# Patient Record
Sex: FEMALE | Race: WHITE | HISPANIC OR LATINO | Employment: UNEMPLOYED | URBAN - METROPOLITAN AREA
[De-identification: names, ages, dates, MRNs, and addresses within clinical notes are randomized per-mention and may not be internally consistent; named-entity substitution may affect disease eponyms.]

---

## 2018-09-19 ENCOUNTER — HOSPITAL ENCOUNTER (EMERGENCY)
Age: 23
Discharge: HOME | End: 2018-09-19

## 2018-09-19 ENCOUNTER — APPOINTMENT (OUTPATIENT)
Dept: GENERAL RADIOLOGY | Age: 23
End: 2018-09-19
Attending: PHYSICIAN ASSISTANT

## 2018-09-19 ENCOUNTER — APPOINTMENT (OUTPATIENT)
Dept: CT IMAGING | Age: 23
End: 2018-09-19
Attending: PHYSICIAN ASSISTANT

## 2018-09-19 VITALS
TEMPERATURE: 98.5 F | RESPIRATION RATE: 18 BRPM | HEART RATE: 82 BPM | DIASTOLIC BLOOD PRESSURE: 93 MMHG | SYSTOLIC BLOOD PRESSURE: 155 MMHG | OXYGEN SATURATION: 100 % | WEIGHT: 240 LBS

## 2018-09-19 DIAGNOSIS — R07.89 ANTERIOR CHEST WALL PAIN: Primary | ICD-10-CM

## 2018-09-19 LAB
ALBUMIN SERPL-MCNC: 4.3 G/DL (ref 3.6–5.1)
ALBUMIN/GLOB SERPL: 1.2 {RATIO} (ref 1–2.4)
ALP SERPL-CCNC: 92 UNITS/L (ref 45–117)
ALT SERPL-CCNC: 38 UNITS/L
ANION GAP SERPL CALC-SCNC: 10 MMOL/L (ref 10–20)
AST SERPL-CCNC: 27 UNITS/L
ATRIAL RATE (BPM): 72
B-HCG UR QL: NEGATIVE
BASOPHILS # BLD AUTO: 0 K/MCL (ref 0–0.3)
BASOPHILS NFR BLD AUTO: 0 %
BILIRUB SERPL-MCNC: 0.3 MG/DL (ref 0.2–1)
BUN SERPL-MCNC: 11 MG/DL (ref 6–20)
BUN/CREAT SERPL: 15 (ref 7–25)
CALCIUM SERPL-MCNC: 8.8 MG/DL (ref 8.4–10.2)
CHLORIDE SERPL-SCNC: 103 MMOL/L (ref 98–107)
CO2 SERPL-SCNC: 29 MMOL/L (ref 21–32)
CREAT SERPL-MCNC: 0.71 MG/DL (ref 0.51–0.95)
DIFFERENTIAL METHOD BLD: NORMAL
EOSINOPHIL # BLD AUTO: 0.1 K/MCL (ref 0.1–0.5)
EOSINOPHIL NFR SPEC: 2 %
ERYTHROCYTE [DISTWIDTH] IN BLOOD: 13.4 % (ref 11–15)
GLOBULIN SER-MCNC: 3.5 G/DL (ref 2–4)
GLUCOSE SERPL-MCNC: 112 MG/DL (ref 65–99)
HCT VFR BLD CALC: 39.2 % (ref 36–46.5)
HGB BLD-MCNC: 13.1 G/DL (ref 12–15.5)
LIPASE SERPL-CCNC: 120 UNITS/L (ref 73–393)
LYMPHOCYTES # BLD MANUAL: 2.5 K/MCL (ref 1–4.8)
LYMPHOCYTES NFR BLD MANUAL: 34 %
MCH RBC QN AUTO: 28.3 PG (ref 26–34)
MCHC RBC AUTO-ENTMCNC: 33.4 G/DL (ref 32–36.5)
MCV RBC AUTO: 84.7 FL (ref 78–100)
MONOCYTES # BLD MANUAL: 0.7 K/MCL (ref 0.3–0.9)
MONOCYTES NFR BLD MANUAL: 10 %
NEUTROPHILS # BLD: 4 K/MCL (ref 1.8–7.7)
NEUTROPHILS NFR BLD AUTO: 54 %
P AXIS (DEGREES): 43
PLATELET # BLD: 303 K/MCL (ref 140–450)
POTASSIUM SERPL-SCNC: 4 MMOL/L (ref 3.4–5.1)
PR-INTERVAL (MSEC): 136
PROT SERPL-MCNC: 7.8 G/DL (ref 6.4–8.2)
QRS-INTERVAL (MSEC): 88
QT-INTERVAL (MSEC): 390
QTC: 427
R AXIS (DEGREES): -54
RBC # BLD: 4.63 MIL/MCL (ref 4–5.2)
REPORT TEXT: NORMAL
SODIUM SERPL-SCNC: 138 MMOL/L (ref 135–145)
T AXIS (DEGREES): 40
TROPONIN I SERPL-MCNC: <0.02 NG/ML
VENTRICULAR RATE EKG/MIN (BPM): 72
WBC # BLD: 7.4 K/MCL (ref 4.2–11)

## 2018-09-19 PROCEDURE — 99284 EMERGENCY DEPT VISIT MOD MDM: CPT | Performed by: PHYSICIAN ASSISTANT

## 2018-09-19 PROCEDURE — 99285 EMERGENCY DEPT VISIT HI MDM: CPT

## 2018-09-19 PROCEDURE — 81025 URINE PREGNANCY TEST: CPT | Performed by: PHYSICIAN ASSISTANT

## 2018-09-19 PROCEDURE — 71046 X-RAY EXAM CHEST 2 VIEWS: CPT

## 2018-09-19 PROCEDURE — 10002803 HB RX 637: Performed by: PHYSICIAN ASSISTANT

## 2018-09-19 PROCEDURE — 10004651 HB RX, NO CHARGE ITEM: Performed by: PHYSICIAN ASSISTANT

## 2018-09-19 PROCEDURE — 36415 COLL VENOUS BLD VENIPUNCTURE: CPT

## 2018-09-19 PROCEDURE — 85025 COMPLETE CBC W/AUTO DIFF WBC: CPT

## 2018-09-19 PROCEDURE — 93005 ELECTROCARDIOGRAM TRACING: CPT | Performed by: PHYSICIAN ASSISTANT

## 2018-09-19 PROCEDURE — 10002801 HB RX 250 W/O HCPCS: Performed by: PHYSICIAN ASSISTANT

## 2018-09-19 PROCEDURE — 10002805 HB CONTRAST AGENT: Performed by: RADIOLOGY

## 2018-09-19 PROCEDURE — 71046 X-RAY EXAM CHEST 2 VIEWS: CPT | Performed by: RADIOLOGY

## 2018-09-19 PROCEDURE — 84484 ASSAY OF TROPONIN QUANT: CPT

## 2018-09-19 PROCEDURE — 10002807 HB RX 258: Performed by: RADIOLOGY

## 2018-09-19 PROCEDURE — 10004180 CT CHEST PE IMAGING

## 2018-09-19 PROCEDURE — 71275 CT ANGIOGRAPHY CHEST: CPT | Performed by: RADIOLOGY

## 2018-09-19 PROCEDURE — 80053 COMPREHEN METABOLIC PANEL: CPT

## 2018-09-19 PROCEDURE — 93010 ELECTROCARDIOGRAM REPORT: CPT | Performed by: INTERNAL MEDICINE

## 2018-09-19 PROCEDURE — 83690 ASSAY OF LIPASE: CPT

## 2018-09-19 RX ORDER — ACETAMINOPHEN 325 MG/1
650 TABLET ORAL ONCE
Status: COMPLETED | OUTPATIENT
Start: 2018-09-19 | End: 2018-09-19

## 2018-09-19 RX ORDER — MAGNESIUM HYDROXIDE/ALUMINUM HYDROXICE/SIMETHICONE 120; 1200; 1200 MG/30ML; MG/30ML; MG/30ML
30 SUSPENSION ORAL PRN
Status: DISCONTINUED | OUTPATIENT
Start: 2018-09-19 | End: 2018-09-19 | Stop reason: HOSPADM

## 2018-09-19 RX ADMIN — ACETAMINOPHEN 650 MG: 325 TABLET ORAL at 18:25

## 2018-09-19 RX ADMIN — ALUMINUM HYDROXIDE, MAGNESIUM HYDROXIDE, AND SIMETHICONE 30 ML: 200; 200; 20 SUSPENSION ORAL at 18:26

## 2018-09-19 RX ADMIN — IOPAMIDOL 75 ML: 755 INJECTION, SOLUTION INTRAVENOUS at 18:54

## 2018-09-19 RX ADMIN — LIDOCAINE HYDROCHLORIDE 15 ML: 20 SOLUTION ORAL; TOPICAL at 18:26

## 2018-09-19 RX ADMIN — SODIUM CHLORIDE 100 ML: 9 INJECTION, SOLUTION INTRAVENOUS at 18:54

## 2018-09-19 ASSESSMENT — PAIN SCALES - GENERAL
PAINLEVEL_OUTOF10: 5
PAINLEVEL_OUTOF10: 4

## 2018-09-19 ASSESSMENT — MOVEMENT AND STRENGTH ASSESSMENTS: FACE_JAW: NO FACIAL DROOP

## 2018-11-16 ENCOUNTER — OFFICE VISIT (OUTPATIENT)
Dept: FAMILY MEDICINE | Age: 23
End: 2018-11-16

## 2018-11-16 VITALS
WEIGHT: 249 LBS | DIASTOLIC BLOOD PRESSURE: 80 MMHG | HEART RATE: 83 BPM | BODY MASS INDEX: 40.02 KG/M2 | HEIGHT: 66 IN | SYSTOLIC BLOOD PRESSURE: 110 MMHG | TEMPERATURE: 98.2 F

## 2018-11-16 DIAGNOSIS — F32.A DEPRESSION, UNSPECIFIED DEPRESSION TYPE: Primary | ICD-10-CM

## 2018-11-16 PROCEDURE — 99213 OFFICE O/P EST LOW 20 MIN: CPT | Performed by: FAMILY MEDICINE

## 2019-01-01 ENCOUNTER — EXTERNAL RECORD (OUTPATIENT)
Dept: OTHER | Age: 24
End: 2019-01-01

## 2019-02-04 ENCOUNTER — TELEPHONE (OUTPATIENT)
Dept: FAMILY MEDICINE | Age: 24
End: 2019-02-04

## 2019-05-15 ENCOUNTER — IMAGING SERVICES (OUTPATIENT)
Dept: GENERAL RADIOLOGY | Age: 24
End: 2019-05-15
Attending: FAMILY MEDICINE

## 2019-05-15 ENCOUNTER — OFFICE VISIT (OUTPATIENT)
Dept: FAMILY MEDICINE | Age: 24
End: 2019-05-15

## 2019-05-15 ENCOUNTER — LAB SERVICES (OUTPATIENT)
Dept: LAB | Age: 24
End: 2019-05-15

## 2019-05-15 VITALS
HEIGHT: 66 IN | DIASTOLIC BLOOD PRESSURE: 80 MMHG | RESPIRATION RATE: 14 BRPM | HEART RATE: 89 BPM | TEMPERATURE: 98.1 F | SYSTOLIC BLOOD PRESSURE: 110 MMHG | BODY MASS INDEX: 39.53 KG/M2 | WEIGHT: 246 LBS

## 2019-05-15 DIAGNOSIS — M54.5 LOW BACK PAIN, UNSPECIFIED BACK PAIN LATERALITY, UNSPECIFIED CHRONICITY, WITH SCIATICA PRESENCE UNSPECIFIED: ICD-10-CM

## 2019-05-15 DIAGNOSIS — Z00.00 ANNUAL PHYSICAL EXAM: Primary | ICD-10-CM

## 2019-05-15 DIAGNOSIS — F33.9 RECURRENT MAJOR DEPRESSIVE DISORDER, REMISSION STATUS UNSPECIFIED (CMD): ICD-10-CM

## 2019-05-15 DIAGNOSIS — Z00.00 ANNUAL PHYSICAL EXAM: ICD-10-CM

## 2019-05-15 DIAGNOSIS — R10.13 EPIGASTRIC PAIN: ICD-10-CM

## 2019-05-15 LAB
BASOPHILS # BLD AUTO: 0 K/MCL (ref 0–0.3)
BASOPHILS NFR BLD AUTO: 0 %
DIFFERENTIAL METHOD BLD: NORMAL
EOSINOPHIL # BLD AUTO: 0.1 K/MCL (ref 0.1–0.5)
EOSINOPHIL NFR SPEC: 2 %
ERYTHROCYTE [DISTWIDTH] IN BLOOD: 13.4 % (ref 11–15)
HCT VFR BLD CALC: 40.3 % (ref 36–46.5)
HGB BLD-MCNC: 13.4 G/DL (ref 12–15.5)
IMM GRANULOCYTES # BLD AUTO: 0 K/MCL (ref 0–0.2)
IMM GRANULOCYTES NFR BLD: 0 %
LYMPHOCYTES # BLD MANUAL: 1.7 K/MCL (ref 1–4.8)
LYMPHOCYTES NFR BLD MANUAL: 32 %
MCH RBC QN AUTO: 28.9 PG (ref 26–34)
MCHC RBC AUTO-ENTMCNC: 33.3 G/DL (ref 32–36.5)
MCV RBC AUTO: 87 FL (ref 78–100)
MONOCYTES # BLD MANUAL: 0.5 K/MCL (ref 0.3–0.9)
MONOCYTES NFR BLD MANUAL: 10 %
NEUTROPHILS # BLD: 2.8 K/MCL (ref 1.8–7.7)
NEUTROPHILS NFR BLD AUTO: 56 %
NRBC BLD MANUAL-RTO: 0 /100 WBC
PLATELET # BLD: 276 K/MCL (ref 140–450)
RBC # BLD: 4.63 MIL/MCL (ref 4–5.2)
WBC # BLD: 5.2 K/MCL (ref 4.2–11)

## 2019-05-15 PROCEDURE — 80050 GENERAL HEALTH PANEL: CPT | Performed by: INTERNAL MEDICINE

## 2019-05-15 PROCEDURE — 80061 LIPID PANEL: CPT | Performed by: INTERNAL MEDICINE

## 2019-05-15 PROCEDURE — 81001 URINALYSIS AUTO W/SCOPE: CPT | Performed by: INTERNAL MEDICINE

## 2019-05-15 PROCEDURE — 72100 X-RAY EXAM L-S SPINE 2/3 VWS: CPT | Performed by: RADIOLOGY

## 2019-05-15 PROCEDURE — 88142 CYTOPATH C/V THIN LAYER: CPT | Performed by: INTERNAL MEDICINE

## 2019-05-15 PROCEDURE — 87591 N.GONORRHOEAE DNA AMP PROB: CPT | Performed by: INTERNAL MEDICINE

## 2019-05-15 PROCEDURE — 87491 CHLMYD TRACH DNA AMP PROBE: CPT | Performed by: INTERNAL MEDICINE

## 2019-05-15 PROCEDURE — 36415 COLL VENOUS BLD VENIPUNCTURE: CPT | Performed by: INTERNAL MEDICINE

## 2019-05-15 PROCEDURE — 99395 PREV VISIT EST AGE 18-39: CPT | Performed by: FAMILY MEDICINE

## 2019-05-15 RX ORDER — ESCITALOPRAM OXALATE 5 MG/1
5 TABLET ORAL DAILY
Qty: 30 TABLET | Refills: 0 | Status: SHIPPED | OUTPATIENT
Start: 2019-05-15 | End: 2019-06-05 | Stop reason: DRUGHIGH

## 2019-05-15 RX ORDER — OMEPRAZOLE 40 MG/1
40 CAPSULE, DELAYED RELEASE ORAL DAILY
Qty: 30 CAPSULE | Refills: 0 | Status: SHIPPED | OUTPATIENT
Start: 2019-05-15 | End: 2019-06-24 | Stop reason: SDUPTHER

## 2019-05-16 ENCOUNTER — TELEPHONE (OUTPATIENT)
Dept: FAMILY MEDICINE | Age: 24
End: 2019-05-16

## 2019-05-16 LAB
ALBUMIN SERPL-MCNC: 4 G/DL (ref 3.6–5.1)
ALBUMIN/GLOB SERPL: 1.4 {RATIO} (ref 1–2.4)
ALP SERPL-CCNC: 99 UNITS/L (ref 45–117)
ALT SERPL-CCNC: 29 UNITS/L
ANION GAP SERPL CALC-SCNC: 10 MMOL/L (ref 10–20)
APPEARANCE UR: CLEAR
AST SERPL-CCNC: 20 UNITS/L
BACTERIA #/AREA URNS HPF: ABNORMAL /HPF
BILIRUB SERPL-MCNC: 0.5 MG/DL (ref 0.2–1)
BILIRUB UR QL STRIP: NEGATIVE
BUN SERPL-MCNC: 12 MG/DL (ref 6–20)
BUN/CREAT SERPL: 16 (ref 7–25)
C TRACH RRNA SPEC QL NAA+PROBE: NEGATIVE
CALCIUM SERPL-MCNC: 8.8 MG/DL (ref 8.4–10.2)
CHLORIDE SERPL-SCNC: 107 MMOL/L (ref 98–107)
CHOLEST SERPL-MCNC: 166 MG/DL
CHOLEST/HDLC SERPL: 3.7 {RATIO}
CO2 SERPL-SCNC: 25 MMOL/L (ref 21–32)
COLOR UR: YELLOW
CREAT SERPL-MCNC: 0.73 MG/DL (ref 0.51–0.95)
FASTING STATUS PATIENT QL REPORTED: 12 HRS
GLOBULIN SER-MCNC: 2.9 G/DL (ref 2–4)
GLUCOSE SERPL-MCNC: 86 MG/DL (ref 65–99)
GLUCOSE UR STRIP-MCNC: NEGATIVE MG/DL
HDLC SERPL-MCNC: 45 MG/DL
HGB UR QL STRIP: NEGATIVE
HYALINE CASTS #/AREA URNS LPF: ABNORMAL /LPF (ref 0–5)
KETONES UR STRIP-MCNC: NEGATIVE MG/DL
LDLC SERPL-MCNC: 103 MG/DL
LENGTH OF FAST TIME PATIENT: 12 HRS
LEUKOCYTE ESTERASE UR QL STRIP: NEGATIVE
N GONORRHOEA RRNA SPEC QL NAA+PROBE: NEGATIVE
NITRITE UR QL STRIP: NEGATIVE
NONHDLC SERPL-MCNC: 121 MG/DL
PH UR STRIP: 6 UNITS (ref 5–7)
POTASSIUM SERPL-SCNC: 4.4 MMOL/L (ref 3.4–5.1)
PROT SERPL-MCNC: 6.9 G/DL (ref 6.4–8.2)
PROT UR STRIP-MCNC: NEGATIVE MG/DL
RBC #/AREA URNS HPF: ABNORMAL /HPF (ref 0–2)
SODIUM SERPL-SCNC: 138 MMOL/L (ref 135–145)
SP GR UR STRIP: 1.02 (ref 1–1.03)
SPECIMEN SOURCE: ABNORMAL
SPECIMEN SOURCE: NORMAL
SQUAMOUS #/AREA URNS HPF: ABNORMAL /HPF (ref 0–5)
TRIGL SERPL-MCNC: 91 MG/DL
TSH SERPL-ACNC: 1.08 MCUNITS/ML (ref 0.35–5)
UROBILINOGEN UR STRIP-MCNC: 0.2 MG/DL (ref 0–1)
WBC #/AREA URNS HPF: ABNORMAL /HPF (ref 0–5)

## 2019-05-17 ENCOUNTER — TELEPHONE (OUTPATIENT)
Dept: FAMILY MEDICINE | Age: 24
End: 2019-05-17

## 2019-05-17 LAB — CYTOLOGY CVX/VAG DOC THIN PREP: NORMAL

## 2019-05-20 ENCOUNTER — APPOINTMENT (OUTPATIENT)
Dept: REHABILITATION | Age: 24
End: 2019-05-20
Attending: FAMILY MEDICINE

## 2019-05-20 ENCOUNTER — TELEPHONE (OUTPATIENT)
Dept: FAMILY MEDICINE | Age: 24
End: 2019-05-20

## 2019-05-22 ENCOUNTER — APPOINTMENT (OUTPATIENT)
Dept: REHABILITATION | Age: 24
End: 2019-05-22
Attending: FAMILY MEDICINE

## 2019-05-28 ENCOUNTER — APPOINTMENT (OUTPATIENT)
Dept: REHABILITATION | Age: 24
End: 2019-05-28
Attending: FAMILY MEDICINE

## 2019-05-30 ENCOUNTER — APPOINTMENT (OUTPATIENT)
Dept: REHABILITATION | Age: 24
End: 2019-05-30
Attending: FAMILY MEDICINE

## 2019-05-31 ENCOUNTER — TELEPHONE (OUTPATIENT)
Dept: GASTROENTEROLOGY | Age: 24
End: 2019-05-31

## 2019-06-03 ENCOUNTER — APPOINTMENT (OUTPATIENT)
Dept: REHABILITATION | Age: 24
End: 2019-06-03
Attending: FAMILY MEDICINE

## 2019-06-05 ENCOUNTER — OFFICE VISIT (OUTPATIENT)
Dept: FAMILY MEDICINE | Age: 24
End: 2019-06-05

## 2019-06-05 VITALS
RESPIRATION RATE: 16 BRPM | HEART RATE: 98 BPM | HEIGHT: 66 IN | SYSTOLIC BLOOD PRESSURE: 110 MMHG | BODY MASS INDEX: 39.28 KG/M2 | DIASTOLIC BLOOD PRESSURE: 80 MMHG | WEIGHT: 244.4 LBS

## 2019-06-05 DIAGNOSIS — F41.9 ANXIETY: Primary | ICD-10-CM

## 2019-06-05 PROCEDURE — 99213 OFFICE O/P EST LOW 20 MIN: CPT | Performed by: FAMILY MEDICINE

## 2019-06-05 RX ORDER — ESCITALOPRAM OXALATE 10 MG/1
10 TABLET ORAL DAILY
Qty: 30 TABLET | Refills: 1 | Status: SHIPPED | OUTPATIENT
Start: 2019-06-05 | End: 2019-06-24 | Stop reason: SDUPTHER

## 2019-06-05 RX ORDER — QUETIAPINE FUMARATE 25 MG/1
25 TABLET, FILM COATED ORAL NIGHTLY
Qty: 30 TABLET | Refills: 1 | Status: SHIPPED | OUTPATIENT
Start: 2019-06-05 | End: 2019-06-24

## 2019-06-06 ENCOUNTER — APPOINTMENT (OUTPATIENT)
Dept: REHABILITATION | Age: 24
End: 2019-06-06
Attending: FAMILY MEDICINE

## 2019-06-14 ENCOUNTER — TELEPHONE (OUTPATIENT)
Dept: GASTROENTEROLOGY | Age: 24
End: 2019-06-14

## 2019-06-24 ENCOUNTER — OFFICE VISIT (OUTPATIENT)
Dept: FAMILY MEDICINE | Age: 24
End: 2019-06-24

## 2019-06-24 VITALS
TEMPERATURE: 97.9 F | HEIGHT: 66 IN | SYSTOLIC BLOOD PRESSURE: 110 MMHG | DIASTOLIC BLOOD PRESSURE: 70 MMHG | WEIGHT: 244 LBS | BODY MASS INDEX: 39.21 KG/M2 | HEART RATE: 86 BPM

## 2019-06-24 DIAGNOSIS — F41.9 ANXIETY: Primary | ICD-10-CM

## 2019-06-24 DIAGNOSIS — F32.A DEPRESSION, UNSPECIFIED DEPRESSION TYPE: ICD-10-CM

## 2019-06-24 DIAGNOSIS — Z23 NEED FOR HPV VACCINATION: ICD-10-CM

## 2019-06-24 DIAGNOSIS — R10.13 EPIGASTRIC PAIN: ICD-10-CM

## 2019-06-24 PROCEDURE — 90471 IMMUNIZATION ADMIN: CPT | Performed by: FAMILY MEDICINE

## 2019-06-24 PROCEDURE — 90651 9VHPV VACCINE 2/3 DOSE IM: CPT | Performed by: FAMILY MEDICINE

## 2019-06-24 PROCEDURE — 99213 OFFICE O/P EST LOW 20 MIN: CPT | Performed by: FAMILY MEDICINE

## 2019-06-24 RX ORDER — BUPROPION HYDROCHLORIDE 150 MG/1
150 TABLET, EXTENDED RELEASE ORAL 2 TIMES DAILY
Qty: 60 TABLET | Refills: 1 | Status: SHIPPED | OUTPATIENT
Start: 2019-06-24 | End: 2019-08-05

## 2019-06-24 RX ORDER — BUPROPION HYDROCHLORIDE 150 MG/1
150 TABLET, EXTENDED RELEASE ORAL 2 TIMES DAILY
Qty: 60 TABLET | Refills: 1 | Status: SHIPPED | OUTPATIENT
Start: 2019-06-24 | End: 2019-06-24 | Stop reason: SDUPTHER

## 2019-06-24 RX ORDER — OMEPRAZOLE 40 MG/1
40 CAPSULE, DELAYED RELEASE ORAL DAILY
Qty: 30 CAPSULE | Refills: 0 | Status: SHIPPED | OUTPATIENT
Start: 2019-06-24 | End: 2019-11-21 | Stop reason: ALTCHOICE

## 2019-06-24 RX ORDER — OMEPRAZOLE 40 MG/1
40 CAPSULE, DELAYED RELEASE ORAL DAILY
Qty: 30 CAPSULE | Refills: 0 | Status: SHIPPED | OUTPATIENT
Start: 2019-06-24 | End: 2020-05-06

## 2019-06-24 RX ORDER — ESCITALOPRAM OXALATE 10 MG/1
10 TABLET ORAL DAILY
Qty: 30 TABLET | Refills: 1 | Status: SHIPPED | OUTPATIENT
Start: 2019-06-24 | End: 2019-08-05

## 2019-08-05 ENCOUNTER — BEHAVIORAL HEALTH (OUTPATIENT)
Dept: PSYCHIATRY | Age: 24
End: 2019-08-05
Attending: FAMILY MEDICINE

## 2019-08-05 DIAGNOSIS — F41.1 GAD (GENERALIZED ANXIETY DISORDER): ICD-10-CM

## 2019-08-05 DIAGNOSIS — F50.2 BULIMIA NERVOSA: ICD-10-CM

## 2019-08-05 DIAGNOSIS — F34.1 PERSISTENT DEPRESSIVE DISORDER: Primary | ICD-10-CM

## 2019-08-05 PROCEDURE — 90792 PSYCH DIAG EVAL W/MED SRVCS: CPT | Performed by: PSYCHIATRY & NEUROLOGY

## 2019-08-05 RX ORDER — FLUOXETINE HYDROCHLORIDE 20 MG/1
20 CAPSULE ORAL
Qty: 30 CAPSULE | Refills: 1 | Status: SHIPPED | OUTPATIENT
Start: 2019-08-05 | End: 2020-05-06

## 2019-10-07 ENCOUNTER — TELEPHONE (OUTPATIENT)
Dept: FAMILY MEDICINE | Age: 24
End: 2019-10-07

## 2019-10-08 ENCOUNTER — OCC HEALTH (OUTPATIENT)
Dept: OCCUPATIONAL MEDICINE | Age: 24
End: 2019-10-08

## 2019-10-08 VITALS
HEIGHT: 66 IN | SYSTOLIC BLOOD PRESSURE: 110 MMHG | BODY MASS INDEX: 39.31 KG/M2 | DIASTOLIC BLOOD PRESSURE: 78 MMHG | HEART RATE: 66 BPM | WEIGHT: 244.6 LBS

## 2019-10-08 DIAGNOSIS — Z02.89 VISIT FOR OCCUPATIONAL HEALTH EXAMINATION: ICD-10-CM

## 2019-10-08 DIAGNOSIS — Z02.89 ENCOUNTER FOR OCCUPATIONAL HEALTH EXAMINATION: Primary | ICD-10-CM

## 2019-10-08 PROCEDURE — OH021 PRE PLACEMENT PHYSICAL EXAM: Performed by: PHYSICIAN ASSISTANT

## 2019-10-16 ENCOUNTER — OFFICE VISIT (OUTPATIENT)
Dept: OCCUPATIONAL MEDICINE | Age: 24
End: 2019-10-16

## 2019-11-08 LAB
APPEARANCE UR: ABNORMAL
BACTERIA UR CULT: NO
BILIRUB UR QL: NEGATIVE
COLOR UR: YELLOW
GLUCOSE UR-MCNC: NEGATIVE MG/DL
HGB UR QL: NEGATIVE
KETONES UR-MCNC: NEGATIVE MG/DL
LEUKOCYTE ESTERASE UR QL STRIP: NEGATIVE
MUCOUS THREADS URNS QL MICRO: PRESENT /LPF
NITRITE UR QL: NEGATIVE
PH UR: 5 [PH] (ref 5–8)
PROT UR QL: NEGATIVE MG/DL
RBC #/AREA URNS HPF: ABNORMAL /HPF (ref 0–2)
SP GR UR: 1.01 (ref 1–1.03)
SQUAMOUS #/AREA URNS HPF: >50 /LPF (ref 0–10)
UROBILINOGEN UR QL: NEGATIVE MG/DL
WBC #/AREA URNS HPF: ABNORMAL /HPF (ref 0–5)

## 2019-11-21 ENCOUNTER — OFFICE VISIT (OUTPATIENT)
Dept: FAMILY MEDICINE | Age: 24
End: 2019-11-21

## 2019-11-21 VITALS
BODY MASS INDEX: 40.34 KG/M2 | WEIGHT: 251 LBS | SYSTOLIC BLOOD PRESSURE: 120 MMHG | HEART RATE: 62 BPM | DIASTOLIC BLOOD PRESSURE: 60 MMHG | TEMPERATURE: 98.3 F | HEIGHT: 66 IN | OXYGEN SATURATION: 99 %

## 2019-11-21 DIAGNOSIS — N89.8 VAGINAL DISCHARGE: Primary | ICD-10-CM

## 2019-11-21 PROCEDURE — 99214 OFFICE O/P EST MOD 30 MIN: CPT | Performed by: NURSE PRACTITIONER

## 2019-11-21 RX ORDER — METRONIDAZOLE 500 MG/1
500 TABLET ORAL
Qty: 14 TABLET | Refills: 0 | Status: SHIPPED | OUTPATIENT
Start: 2019-11-21 | End: 2020-05-06

## 2019-11-22 LAB
C TRACH RRNA SPEC QL NAA+PROBE: NEGATIVE
CANDIDA RRNA VAG QL PROBE: NEGATIVE
G VAGINALIS RRNA GENITAL QL PROBE: NEGATIVE
N GONORRHOEA RRNA SPEC QL NAA+PROBE: NEGATIVE
SPECIMEN SOURCE: NORMAL
T VAGINALIS RRNA GENITAL QL PROBE: NEGATIVE

## 2019-11-25 ENCOUNTER — TELEPHONE (OUTPATIENT)
Dept: FAMILY MEDICINE | Age: 24
End: 2019-11-25

## 2020-02-21 ENCOUNTER — TELEPHONE (OUTPATIENT)
Dept: FAMILY MEDICINE | Age: 25
End: 2020-02-21

## 2020-05-05 ENCOUNTER — PRE-EVAL (OUTPATIENT)
Dept: FAMILY MEDICINE | Age: 25
End: 2020-05-05

## 2020-05-05 ENCOUNTER — E-ADVICE (OUTPATIENT)
Dept: FAMILY MEDICINE | Age: 25
End: 2020-05-05

## 2020-05-05 ENCOUNTER — TELEPHONE (OUTPATIENT)
Dept: FAMILY MEDICINE | Age: 25
End: 2020-05-05

## 2020-05-05 RX ORDER — BUPROPION HYDROCHLORIDE 150 MG/1
150 TABLET, EXTENDED RELEASE ORAL 2 TIMES DAILY
Qty: 60 TABLET | Refills: 1 | OUTPATIENT
Start: 2020-05-05

## 2020-05-06 ENCOUNTER — OFFICE VISIT (OUTPATIENT)
Dept: FAMILY MEDICINE | Age: 25
End: 2020-05-06

## 2020-05-06 ENCOUNTER — APPOINTMENT (OUTPATIENT)
Dept: FAMILY MEDICINE | Age: 25
End: 2020-05-06

## 2020-05-06 VITALS
SYSTOLIC BLOOD PRESSURE: 100 MMHG | HEIGHT: 66 IN | BODY MASS INDEX: 39.21 KG/M2 | WEIGHT: 244 LBS | DIASTOLIC BLOOD PRESSURE: 72 MMHG | TEMPERATURE: 97.9 F | HEART RATE: 85 BPM

## 2020-05-06 DIAGNOSIS — F32.A DEPRESSION, UNSPECIFIED DEPRESSION TYPE: Primary | ICD-10-CM

## 2020-05-06 PROCEDURE — 99213 OFFICE O/P EST LOW 20 MIN: CPT | Performed by: FAMILY MEDICINE

## 2020-05-06 RX ORDER — BUPROPION HYDROCHLORIDE 150 MG/1
150 TABLET, EXTENDED RELEASE ORAL 2 TIMES DAILY
Qty: 60 TABLET | Refills: 0 | Status: SHIPPED | OUTPATIENT
Start: 2020-05-06 | End: 2021-04-08 | Stop reason: SDUPTHER

## 2020-05-11 ENCOUNTER — TELEPHONE (OUTPATIENT)
Dept: FAMILY MEDICINE | Age: 25
End: 2020-05-11

## 2020-05-11 ENCOUNTER — E-ADVICE (OUTPATIENT)
Dept: FAMILY MEDICINE | Age: 25
End: 2020-05-11

## 2020-05-11 DIAGNOSIS — F32.A DEPRESSION, UNSPECIFIED DEPRESSION TYPE: Primary | ICD-10-CM

## 2020-05-12 ENCOUNTER — TELEPHONE (OUTPATIENT)
Dept: FAMILY MEDICINE | Age: 25
End: 2020-05-12

## 2020-05-12 ENCOUNTER — E-ADVICE (OUTPATIENT)
Dept: FAMILY MEDICINE | Age: 25
End: 2020-05-12

## 2020-05-12 DIAGNOSIS — R21 RASH: Primary | ICD-10-CM

## 2020-05-12 RX ORDER — PREDNISONE 20 MG/1
20 TABLET ORAL DAILY
Qty: 7 TABLET | Refills: 0 | Status: SHIPPED | OUTPATIENT
Start: 2020-05-12 | End: 2020-05-12 | Stop reason: CLARIF

## 2020-05-12 RX ORDER — PREDNISONE 20 MG/1
20 TABLET ORAL DAILY
Qty: 7 TABLET | Refills: 0 | Status: SHIPPED | OUTPATIENT
Start: 2020-05-12 | End: 2021-04-08 | Stop reason: ALTCHOICE

## 2020-05-18 ENCOUNTER — E-ADVICE (OUTPATIENT)
Dept: FAMILY MEDICINE | Age: 25
End: 2020-05-18

## 2020-05-19 DIAGNOSIS — L50.9 HIVES: Primary | ICD-10-CM

## 2020-06-02 ENCOUNTER — PRE-EVAL (OUTPATIENT)
Dept: FAMILY MEDICINE | Age: 25
End: 2020-06-02

## 2020-06-03 ENCOUNTER — APPOINTMENT (OUTPATIENT)
Dept: FAMILY MEDICINE | Age: 25
End: 2020-06-03

## 2021-04-08 ENCOUNTER — OFFICE VISIT (OUTPATIENT)
Dept: FAMILY MEDICINE | Age: 26
End: 2021-04-08

## 2021-04-08 VITALS
BODY MASS INDEX: 41.51 KG/M2 | RESPIRATION RATE: 18 BRPM | OXYGEN SATURATION: 100 % | DIASTOLIC BLOOD PRESSURE: 54 MMHG | HEIGHT: 66 IN | WEIGHT: 258.3 LBS | TEMPERATURE: 99.4 F | HEART RATE: 88 BPM | SYSTOLIC BLOOD PRESSURE: 101 MMHG

## 2021-04-08 DIAGNOSIS — F32.A DEPRESSION, UNSPECIFIED DEPRESSION TYPE: ICD-10-CM

## 2021-04-08 DIAGNOSIS — M72.2 BILATERAL PLANTAR FASCIITIS: Primary | ICD-10-CM

## 2021-04-08 PROCEDURE — 99214 OFFICE O/P EST MOD 30 MIN: CPT | Performed by: NURSE PRACTITIONER

## 2021-04-08 RX ORDER — PREDNISONE 20 MG/1
TABLET ORAL
Qty: 10 TABLET | Refills: 0 | Status: SHIPPED | OUTPATIENT
Start: 2021-04-08 | End: 2021-04-08 | Stop reason: SDUPTHER

## 2021-04-08 RX ORDER — PREDNISONE 20 MG/1
40 TABLET ORAL DAILY
Qty: 10 TABLET | Refills: 0 | Status: SHIPPED | OUTPATIENT
Start: 2021-04-08

## 2021-04-08 RX ORDER — BUPROPION HYDROCHLORIDE 150 MG/1
150 TABLET, EXTENDED RELEASE ORAL 2 TIMES DAILY
Qty: 60 TABLET | Refills: 0 | Status: SHIPPED | OUTPATIENT
Start: 2021-04-08

## 2021-04-08 ASSESSMENT — PATIENT HEALTH QUESTIONNAIRE - PHQ9
CLINICAL INTERPRETATION OF PHQ9 SCORE: NO FURTHER SCREENING NEEDED
CLINICAL INTERPRETATION OF PHQ2 SCORE: NO FURTHER SCREENING NEEDED
2. FEELING DOWN, DEPRESSED OR HOPELESS: SEVERAL DAYS
SUM OF ALL RESPONSES TO PHQ9 QUESTIONS 1 AND 2: 2
SUM OF ALL RESPONSES TO PHQ9 QUESTIONS 1 AND 2: 2
1. LITTLE INTEREST OR PLEASURE IN DOING THINGS: SEVERAL DAYS

## 2021-04-14 ENCOUNTER — TELEPHONE (OUTPATIENT)
Dept: FAMILY MEDICINE | Age: 26
End: 2021-04-14

## 2021-04-14 RX ORDER — OMEPRAZOLE 40 MG/1
40 CAPSULE, DELAYED RELEASE ORAL DAILY
Qty: 30 CAPSULE | Refills: 1 | Status: SHIPPED | OUTPATIENT
Start: 2021-04-14

## 2021-04-22 ENCOUNTER — E-ADVICE (OUTPATIENT)
Dept: FAMILY MEDICINE | Age: 26
End: 2021-04-22

## 2021-10-19 ENCOUNTER — OFFICE VISIT (OUTPATIENT)
Dept: OCCUPATIONAL MEDICINE | Age: 26
End: 2021-10-19

## 2021-10-19 DIAGNOSIS — Z02.89 ENCOUNTER FOR OCCUPATIONAL HEALTH EXAMINATION: ICD-10-CM

## 2021-10-19 PROCEDURE — OH035 DOT/N-DOT DS COLLECTION ONLY (ALL TYPES): Performed by: NURSE PRACTITIONER

## 2021-10-26 ENCOUNTER — APPOINTMENT (OUTPATIENT)
Dept: OCCUPATIONAL MEDICINE | Age: 26
End: 2021-10-26

## 2022-12-27 ENCOUNTER — HOSPITAL ENCOUNTER (INPATIENT)
Age: 27
LOS: 6 days | Discharge: HOME | DRG: 885 | End: 2023-01-03
Attending: PSYCHIATRY & NEUROLOGY | Admitting: PSYCHIATRY & NEUROLOGY
Payer: COMMERCIAL

## 2022-12-27 ENCOUNTER — APPOINTMENT (OUTPATIENT)
Dept: CT IMAGING | Age: 27
DRG: 885 | End: 2022-12-27
Payer: COMMERCIAL

## 2022-12-27 DIAGNOSIS — F29 PSYCHOSIS, UNSPECIFIED PSYCHOSIS TYPE (HCC): Primary | ICD-10-CM

## 2022-12-27 LAB
ACETAMINOPHEN LEVEL: <5 UG/ML (ref 10–30)
ALBUMIN SERPL-MCNC: 4.3 G/DL (ref 3.5–4.6)
ALP BLD-CCNC: 100 U/L (ref 40–130)
ALT SERPL-CCNC: 22 U/L (ref 0–33)
ANION GAP SERPL CALCULATED.3IONS-SCNC: 18 MEQ/L (ref 9–15)
AST SERPL-CCNC: 23 U/L (ref 0–35)
BASOPHILS ABSOLUTE: 0.1 K/UL (ref 0–0.2)
BASOPHILS RELATIVE PERCENT: 0.6 %
BILIRUB SERPL-MCNC: 0.7 MG/DL (ref 0.2–0.7)
BUN BLDV-MCNC: 4 MG/DL (ref 6–20)
CALCIUM SERPL-MCNC: 9.3 MG/DL (ref 8.5–9.9)
CHLORIDE BLD-SCNC: 99 MEQ/L (ref 95–107)
CHOLESTEROL, TOTAL: 171 MG/DL (ref 0–199)
CO2: 20 MEQ/L (ref 20–31)
CREAT SERPL-MCNC: 0.69 MG/DL (ref 0.5–0.9)
EOSINOPHILS ABSOLUTE: 0.1 K/UL (ref 0–0.7)
EOSINOPHILS RELATIVE PERCENT: 1.4 %
ETHANOL PERCENT: NORMAL G/DL
ETHANOL: <10 MG/DL (ref 0–0.08)
GFR SERPL CREATININE-BSD FRML MDRD: >60 ML/MIN/{1.73_M2}
GLOBULIN: 3.2 G/DL (ref 2.3–3.5)
GLUCOSE BLD-MCNC: 98 MG/DL (ref 70–99)
GONADOTROPIN, CHORIONIC (HCG) QUANT: <0.1 MIU/ML
HCT VFR BLD CALC: 42.7 % (ref 37–47)
HDLC SERPL-MCNC: 62 MG/DL (ref 40–59)
HEMOGLOBIN: 14.5 G/DL (ref 12–16)
LDL CHOLESTEROL CALCULATED: 94 MG/DL (ref 0–129)
LYMPHOCYTES ABSOLUTE: 1.9 K/UL (ref 1–4.8)
LYMPHOCYTES RELATIVE PERCENT: 20.4 %
MCH RBC QN AUTO: 29.7 PG (ref 27–31.3)
MCHC RBC AUTO-ENTMCNC: 33.9 % (ref 33–37)
MCV RBC AUTO: 87.5 FL (ref 79.4–94.8)
MONOCYTES ABSOLUTE: 0.9 K/UL (ref 0.2–0.8)
MONOCYTES RELATIVE PERCENT: 9.6 %
NEUTROPHILS ABSOLUTE: 6.4 K/UL (ref 1.4–6.5)
NEUTROPHILS RELATIVE PERCENT: 68 %
PDW BLD-RTO: 13.3 % (ref 11.5–14.5)
PLATELET # BLD: 312 K/UL (ref 130–400)
POTASSIUM SERPL-SCNC: 3.6 MEQ/L (ref 3.4–4.9)
RBC # BLD: 4.88 M/UL (ref 4.2–5.4)
SALICYLATE, SERUM: <0.3 MG/DL (ref 15–30)
SARS-COV-2, NAAT: NOT DETECTED
SODIUM BLD-SCNC: 137 MEQ/L (ref 135–144)
TOTAL PROTEIN: 7.5 G/DL (ref 6.3–8)
TRIGL SERPL-MCNC: 73 MG/DL (ref 0–150)
TSH SERPL DL<=0.05 MIU/L-ACNC: 1.2 UIU/ML (ref 0.44–3.86)
WBC # BLD: 9.4 K/UL (ref 4.8–10.8)

## 2022-12-27 PROCEDURE — 80179 DRUG ASSAY SALICYLATE: CPT

## 2022-12-27 PROCEDURE — 82077 ASSAY SPEC XCP UR&BREATH IA: CPT

## 2022-12-27 PROCEDURE — 85025 COMPLETE CBC W/AUTO DIFF WBC: CPT

## 2022-12-27 PROCEDURE — 99285 EMERGENCY DEPT VISIT HI MDM: CPT

## 2022-12-27 PROCEDURE — 80307 DRUG TEST PRSMV CHEM ANLYZR: CPT

## 2022-12-27 PROCEDURE — 87635 SARS-COV-2 COVID-19 AMP PRB: CPT

## 2022-12-27 PROCEDURE — 36415 COLL VENOUS BLD VENIPUNCTURE: CPT

## 2022-12-27 PROCEDURE — 80143 DRUG ASSAY ACETAMINOPHEN: CPT

## 2022-12-27 PROCEDURE — 80053 COMPREHEN METABOLIC PANEL: CPT

## 2022-12-27 PROCEDURE — 84443 ASSAY THYROID STIM HORMONE: CPT

## 2022-12-27 PROCEDURE — 80061 LIPID PANEL: CPT

## 2022-12-27 PROCEDURE — 81001 URINALYSIS AUTO W/SCOPE: CPT

## 2022-12-27 PROCEDURE — 82550 ASSAY OF CK (CPK): CPT

## 2022-12-27 PROCEDURE — 84702 CHORIONIC GONADOTROPIN TEST: CPT

## 2022-12-27 RX ORDER — LORAZEPAM 2 MG/ML
2 INJECTION INTRAMUSCULAR ONCE
Status: COMPLETED | OUTPATIENT
Start: 2022-12-27 | End: 2022-12-28

## 2022-12-27 RX ORDER — DIPHENHYDRAMINE HYDROCHLORIDE 50 MG/ML
50 INJECTION INTRAMUSCULAR; INTRAVENOUS ONCE
Status: COMPLETED | OUTPATIENT
Start: 2022-12-27 | End: 2022-12-28

## 2022-12-28 PROBLEM — F29 PSYCHOSIS, UNSPECIFIED PSYCHOSIS TYPE (HCC): Status: ACTIVE | Noted: 2022-12-28

## 2022-12-28 LAB
AMPHETAMINE SCREEN, URINE: NORMAL
BACTERIA: ABNORMAL /HPF
BARBITURATE SCREEN URINE: NORMAL
BENZODIAZEPINE SCREEN, URINE: NORMAL
BILIRUBIN URINE: NEGATIVE
BLOOD, URINE: NEGATIVE
CANNABINOID SCREEN URINE: NORMAL
CLARITY: CLEAR
COCAINE METABOLITE SCREEN URINE: NORMAL
COLOR: YELLOW
EPITHELIAL CELLS, UA: ABNORMAL /HPF (ref 0–5)
FENTANYL SCREEN, URINE: NORMAL
GLUCOSE URINE: NEGATIVE MG/DL
HYALINE CASTS: ABNORMAL /HPF (ref 0–5)
KETONES, URINE: ABNORMAL MG/DL
LEUKOCYTE ESTERASE, URINE: ABNORMAL
Lab: NORMAL
METHADONE SCREEN, URINE: NORMAL
NITRITE, URINE: NEGATIVE
OPIATE SCREEN URINE: NORMAL
OXYCODONE URINE: NORMAL
PH UA: 5.5 (ref 5–9)
PHENCYCLIDINE SCREEN URINE: NORMAL
PROPOXYPHENE SCREEN: NORMAL
PROTEIN UA: NEGATIVE MG/DL
RBC UA: ABNORMAL /HPF (ref 0–2)
SPECIFIC GRAVITY UA: 1.01 (ref 1–1.03)
TOTAL CK: 121 U/L (ref 0–170)
URINE REFLEX TO CULTURE: ABNORMAL
UROBILINOGEN, URINE: 0.2 E.U./DL
WBC UA: ABNORMAL /HPF (ref 0–5)

## 2022-12-28 PROCEDURE — 1240000000 HC EMOTIONAL WELLNESS R&B

## 2022-12-28 PROCEDURE — 96372 THER/PROPH/DIAG INJ SC/IM: CPT

## 2022-12-28 PROCEDURE — 6360000002 HC RX W HCPCS

## 2022-12-28 RX ORDER — TRAZODONE HYDROCHLORIDE 50 MG/1
50 TABLET ORAL NIGHTLY PRN
Status: DISCONTINUED | OUTPATIENT
Start: 2022-12-28 | End: 2023-01-03 | Stop reason: HOSPADM

## 2022-12-28 RX ORDER — HALOPERIDOL 5 MG/1
5 TABLET ORAL EVERY 6 HOURS PRN
Status: DISCONTINUED | OUTPATIENT
Start: 2022-12-28 | End: 2023-01-03 | Stop reason: HOSPADM

## 2022-12-28 RX ORDER — POLYETHYLENE GLYCOL 3350 17 G/17G
17 POWDER, FOR SOLUTION ORAL DAILY PRN
Status: DISCONTINUED | OUTPATIENT
Start: 2022-12-28 | End: 2023-01-03 | Stop reason: HOSPADM

## 2022-12-28 RX ORDER — HALOPERIDOL 5 MG/ML
INJECTION INTRAMUSCULAR
Status: COMPLETED
Start: 2022-12-28 | End: 2022-12-28

## 2022-12-28 RX ORDER — HALOPERIDOL 5 MG/ML
5 INJECTION INTRAMUSCULAR ONCE
Status: COMPLETED | OUTPATIENT
Start: 2022-12-28 | End: 2022-12-28

## 2022-12-28 RX ORDER — HYDROXYZINE PAMOATE 50 MG/1
50 CAPSULE ORAL EVERY 6 HOURS PRN
Status: DISCONTINUED | OUTPATIENT
Start: 2022-12-28 | End: 2023-01-03 | Stop reason: HOSPADM

## 2022-12-28 RX ORDER — BENZTROPINE MESYLATE 1 MG/ML
2 INJECTION INTRAMUSCULAR; INTRAVENOUS 2 TIMES DAILY PRN
Status: DISCONTINUED | OUTPATIENT
Start: 2022-12-28 | End: 2023-01-03 | Stop reason: HOSPADM

## 2022-12-28 RX ORDER — MAGNESIUM HYDROXIDE/ALUMINUM HYDROXICE/SIMETHICONE 120; 1200; 1200 MG/30ML; MG/30ML; MG/30ML
30 SUSPENSION ORAL EVERY 6 HOURS PRN
Status: DISCONTINUED | OUTPATIENT
Start: 2022-12-28 | End: 2023-01-03 | Stop reason: HOSPADM

## 2022-12-28 RX ORDER — ACETAMINOPHEN 325 MG/1
650 TABLET ORAL EVERY 4 HOURS PRN
Status: DISCONTINUED | OUTPATIENT
Start: 2022-12-28 | End: 2023-01-03 | Stop reason: HOSPADM

## 2022-12-28 RX ORDER — NICOTINE 21 MG/24HR
1 PATCH, TRANSDERMAL 24 HOURS TRANSDERMAL DAILY
Status: DISCONTINUED | OUTPATIENT
Start: 2022-12-28 | End: 2023-01-03 | Stop reason: HOSPADM

## 2022-12-28 RX ORDER — HYDROXYZINE HYDROCHLORIDE 50 MG/ML
50 INJECTION, SOLUTION INTRAMUSCULAR EVERY 6 HOURS PRN
Status: DISCONTINUED | OUTPATIENT
Start: 2022-12-28 | End: 2023-01-03 | Stop reason: HOSPADM

## 2022-12-28 RX ORDER — HALOPERIDOL 5 MG/ML
5 INJECTION INTRAMUSCULAR EVERY 6 HOURS PRN
Status: DISCONTINUED | OUTPATIENT
Start: 2022-12-28 | End: 2023-01-03 | Stop reason: HOSPADM

## 2022-12-28 RX ADMIN — LORAZEPAM 2 MG: 2 INJECTION INTRAMUSCULAR; INTRAVENOUS at 00:18

## 2022-12-28 RX ADMIN — DIPHENHYDRAMINE HYDROCHLORIDE 50 MG: 50 INJECTION, SOLUTION INTRAMUSCULAR; INTRAVENOUS at 00:18

## 2022-12-28 RX ADMIN — HALOPERIDOL LACTATE 5 MG: 5 INJECTION, SOLUTION INTRAMUSCULAR at 03:29

## 2022-12-28 RX ADMIN — HALOPERIDOL 5 MG: 5 INJECTION INTRAMUSCULAR at 03:29

## 2022-12-28 ASSESSMENT — SLEEP AND FATIGUE QUESTIONNAIRES
DO YOU HAVE DIFFICULTY SLEEPING: YES
SLEEP PATTERN: DISTURBED/INTERRUPTED SLEEP
AVERAGE NUMBER OF SLEEP HOURS: 3
DO YOU USE A SLEEP AID: NO

## 2022-12-28 NOTE — ED NOTES
Awaiting Patient to provide a urine specimen & final disposition per report from [facility]. Patient sitting up in bed with eyes closed & no acute distress noted.      Maria Fernanda Galo RN  12/28/22 7167

## 2022-12-28 NOTE — ED NOTES
Patient remains at the nursing station demanding to have her fucking sweater she wants her belongings back now.      Navid Paulson RN  12/28/22 5260

## 2022-12-28 NOTE — ED NOTES
The [address] here to assess Patient for level of care. Patient up to bathroom. Gait remains steady. Urine specimen obtained & sent to lab.      Flaco Anders RN  12/28/22 6516

## 2022-12-28 NOTE — ED NOTES
Opt is agitated, yelling at Staff, demanding to [facility] home\" patient is not verbally directable. Patient is refusing to follow verbal directions. Patient is medicated per mar.       Maura Rutledge RN  12/28/22 8508

## 2022-12-28 NOTE — GROUP NOTE
Group Therapy Note    Date: 12/28/2022    Group Start Time: 1600  Group End Time: 36  Group Topic: Healthy Living/Wellness    [facility]    Cindy Burdick RN        Group Therapy Note    Attendees:17/17       Patient's Goal:  Attend group    Notes:  Good participation    Status After Intervention:  Unchanged    Participation Level:  Active Listener    Participation Quality: Appropriate      Speech:  normal      Thought Process/Content: Logical      Affective Functioning: Congruent      Mood: euthymic      Level of consciousness:  Alert      Response to Learning: Progressing to goal      Endings: None Reported    Modes of Intervention: Support      Discipline Responsible: Registered Nurse      Signature:  Cindy Burdick RN

## 2022-12-28 NOTE — ED NOTES
Patient requested pants, when given pants patient began yelling at RN \"I dont want a fucking patch I want a cigarette theres a big difference. You fucking took all my stuff, I just want fucking out of here. \" pt is standing at the nurses station, tense/posturing.           Mj Fontana, RONDA  12/28/22 6096

## 2022-12-28 NOTE — ED NOTES
Patient appears to be  sleeping respirations are even and unlabored no distress noted at this time.        Sylvia Lara RN  12/28/22 5722

## 2022-12-28 NOTE — ED NOTES
Left hipaa compliant  at [facility] for their manager [facility] [phone] ext [phone].  To please call back regarding a cat that was brought to the hospital.      Gregg Guzman RN  12/28/22 0126

## 2022-12-28 NOTE — ED NOTES
Appetite poor for breakfast. Continues to deny need to void. PO fluids given. Up to bathroom X 1 & reported she can not void. Gait steady.       Marcos Kingston RN  12/28/22 5722

## 2022-12-28 NOTE — ED NOTES
Pt possibly has no insurance called registration they will work on it. THE [facility] talked with Aravind Prescott staff  Advised Geraldine Olguin possibly need a bed for [facility], indigent  Will need an assessment for pt  Answered Donovan's questions gave him a report on the pt.      Jorgito Cummings RN  12/28/22 8115

## 2022-12-28 NOTE — ED NOTES
Patient now sitting in [facility], [address] [place] [address]  12/28/22 9216

## 2022-12-28 NOTE — ED NOTES
Pt is in bed area quiet and cooperative with even respirations no problems and no C/O any kind expressed. Pt is aware of the need for urine specimen and has a cup to void with.      Gurdeep Pastrana RN  12/28/22 4429

## 2022-12-28 NOTE — ED PROVIDER NOTES
[facility]  eMERGENCY dEPARTMENT eNCOUnter      Pt Name: Brent Daniel  MRN: [id number]  Dar[place]fkyra 1995  Date of evaluation: 12/27/2022  Provider: Dennie Molt, PA        HISTORY OF PRESENT ILLNESS    Brent Daniel is a 32 y.o. female presents to the ED via EMS, reportedly she was at the grocery store experiencing hallucinations, reporting that \"black men\" were chasing her and trying to harm her. States there was a \"gas leak\" in the store she was at, suddenly a google alert came up on her phone to ask if she was safe and she pressed \"no. \" States she does not feel safe at all. She drove from out of town, patient states recent diagnosis of \"paranoia\" but denies schizophrenia diagnosis. Pt's mother is on the phone during initial interview, mother interjects to state that patient does indeed have schizophrenia and needs psychiatric help. Patient becomes very upset at this and adamantly denies this need. Pt presents hyperactive, rapid pressured speech. Disorganized speech and behaviors. She is slightly agitated and uncooperative. Denying HI/SI. Unable to state why she drove to [place] from out of town. I later spoke on the phone with mother's best friend who states change in behavior x1 month, continued paranoid delusions for the past month, persecutory delusions. Recently packed a suitcase, hopped on a bus and came to [place]. She is from [place]. Believes family members in [place] are conspiring against her. Patient stated to mother's best friend that she has had to drive 80 mph on the highway d/t people following her. Father is diagnosed with schizophrenia. PMHx also includes thrombosis history, on eliquis. REVIEW OF SYSTEMS       Review of Systems   Unable to perform ROS: Psychiatric disorder   Psychiatric/Behavioral:  Positive for hallucinations. Negative for suicidal ideas. The patient is hyperactive. Except as noted above the remainder of the review of systems was reviewed and negative. PAST MEDICAL HISTORY     Past Medical History:   Diagnosis Date    DVT (deep venous thrombosis) ([facility] [address] 09/01/2021    patient reports DVT, states she had pain in her calf         SURGICAL HISTORY     No past surgical history on file. CURRENT MEDICATIONS       Current Discharge Medication List        CONTINUE these medications which have NOT CHANGED    Details   phentermine (ADIPEX-P) 37.5 MG tablet Take 37.5 mg by mouth daily. ALLERGIES     Wellbutrin [bupropion]    FAMILY HISTORY     No family history on file. SOCIAL HISTORY       Social History     Socioeconomic History    Marital status: Single   Tobacco Use    Smoking status: Every Day     Types: Cigarettes         PHYSICAL EXAM        ED Triage Vitals   BP Temp Temp Source Heart Rate Resp SpO2 Height Weight   12/27/22 2018 12/27/22 2028 12/27/22 2028 12/27/22 2018 12/27/22 2018 12/27/22 2018 12/27/22 2029 12/27/22 2029   (!) 134/103 98.1 °F (36.7 °C) Oral (!) 128 20 100 % 5' 6\" (1.676 m) 240 lb (108.9 kg)       Physical Exam  Constitutional:       General: She is not in acute distress. Appearance: Normal appearance. She is ill-appearing. HENT:      Head: Normocephalic and atraumatic. Right Ear: External ear normal.      Left Ear: External ear normal.      Nose: Nose normal.      Mouth/Throat:      Mouth: Mucous membranes are moist.      Pharynx: Oropharynx is clear. Eyes:      Extraocular Movements: Extraocular movements intact. Cardiovascular:      Rate and Rhythm: Normal rate and regular rhythm. Pulmonary:      Effort: Pulmonary effort is normal. No respiratory distress. Breath sounds: Normal breath sounds. Abdominal:      General: Bowel sounds are normal.      Palpations: Abdomen is soft. Tenderness: There is no abdominal tenderness. Musculoskeletal:         General: Normal range of motion. Cervical back: Normal range of motion. Skin:     General: Skin is warm.    Neurological:      Mental Status: She is alert and oriented to person, place, and time. Psychiatric:         Attention and Perception: She is inattentive. Mood and Affect: Mood is anxious. Affect is labile. Speech: Speech is rapid and pressured and tangential.         Behavior: Behavior is uncooperative and agitated. Thought Content: Thought content is paranoid and delusional. Thought content does not include homicidal or suicidal ideation. Thought content does not include homicidal or suicidal plan. Judgment: Judgment is impulsive.          LABS:  Labs Reviewed   CBC WITH AUTO DIFFERENTIAL - Abnormal; Notable for the following components:       Result Value    Monocytes Absolute 0.9 (*)     All other components within normal limits   COMPREHENSIVE METABOLIC PANEL - Abnormal; Notable for the following components:    Anion Gap 18 (*)     BUN 4 (*)     All other components within normal limits   URINALYSIS WITH REFLEX TO CULTURE - Abnormal; Notable for the following components:    Ketones, Urine TRACE (*)     Leukocyte Esterase, Urine TRACE (*)     All other components within normal limits   ACETAMINOPHEN LEVEL - Abnormal; Notable for the following components:    Acetaminophen Level <5 (*)     All other components within normal limits   SALICYLATE LEVEL - Abnormal; Notable for the following components:    Salicylate, Serum <9.6 (*)     All other components within normal limits   LIPID PANEL - Abnormal; Notable for the following components:    HDL 62 (*)     All other components within normal limits   MICROSCOPIC URINALYSIS - Abnormal; Notable for the following components:    Bacteria, UA RARE (*)     WBC, UA 6-9 (*)     All other components within normal limits   COVID-19, RAPID   ETHANOL   TSH   URINE DRUG SCREEN   HCG, QUANTITATIVE, PREGNANCY   CK         MDM:   Vitals:    Vitals:    12/28/22 1700 12/29/22 0815 12/29/22 1614 12/30/22 0812   BP: (!) 131/91 (!) 142/86 133/84 123/87   Pulse: 62 100 89 90   Resp: 16 16  18   Temp: 98.2 °F (36.8 °C) 98.8 °F (37.1 °C) 98.2 °F (36.8 °C) 98.6 °F (37 °C)   TempSrc:  Oral  Oral   SpO2: 97% 94% 95% 99%   Weight:       Height:           Patient presents emergency department for paranoia. Patient presents recently moving from out of town and there is really no medical history that loads on her chart initially. Due to this it is unknown if she has true psychiatric history. Due to the CT head had been initially ordered to rule out underlying pathology contributing to her behavior. During ED stay her past medical history does load on to chart review in care everywhere, demonstrates that she does have history of similar paranoid episodes and has been evaluated for this in the past as recently as several days ago. Though patient continues to demonstrate hyperactive, potentially hypo/manic behavior, paranoid delusions, she is alert and oriented x4, no focal deficits, otherwise of appropriate cognition. CT head is canceled. Medical work-up is initiated and pending medical clearance at time of this dictation. Signed out. CRITICAL CARE TIME   Total CriticalCare time was 0 minutes, excluding separately reportable procedures. There was a high probability of clinically significant/life threatening deterioration in the patient's condition which required my urgent intervention. PROCEDURES:  Unlessotherwise noted below, none      Procedures      FINAL IMPRESSION      1.  Psychosis, unspecified psychosis type [facility])          DISPOSITION/PLAN   DISPOSITION Decision To Admit 12/28/2022 12:19:29 PM          ALFREDA Conner (electronically signed)  Attending Emergency Physician          Kayla Conner  12/30/22 1911

## 2022-12-28 NOTE — ED NOTES
Awake, alert, upset about being in JO. 1:1 Constant Observer at bedside     Yahaira Quiles RN  12/28/22 5734

## 2022-12-28 NOTE — CARE COORDINATION
Group Therapy Note    Date: 12/28/2022  Start Time: 1300  End Time:  1330    Number of Participants: 8    Type of Group: Cognitive Skills    Patient's Goal:  To participate in cognitive skills group. Notes: Patient declined to attend psychoeducation group at 1300 despite encouragement by staff.      Discipline Responsible: /Counselor    TACO Rushing

## 2022-12-28 NOTE — PROGRESS NOTES
Patient was sitting on her bed in her room. She had a flat affect, was worrisome and tired but she was agreeable to do the leisure assessment. Patient did have a difficult time concentrating. Patient stated she is a little depressed and stressed. Patient stated she called the police and they brought her to the hospital because she was saying some stuff, patient did not elaborate. Patient has paranoid thoughts and stated she was at [facility] and she felt people were looking at her. She denies any auditory or visual hallucinations. She denies being suicidal.  Patient stays to herself a lot. She denies using drugs and drinks alcohol twice a week. She enjoys music, watching TV and working out. Resource folder given.  Electronically signed by Stephanie Cruz, [address] on 12/28/2022 at 3:02 PM

## 2022-12-28 NOTE — ED NOTES
Pt remains in the bed area with even respirations no problems and no C/O any kind expressed. Pt still has not been able to void has been up in the bathroom.      Nguyen Weinstein RN  12/28/22 0532

## 2022-12-28 NOTE — ED NOTES
Per [facility], Patient meets criteria for a higher level of care for further psychiatric evaluation & [facility] will approve a [place] bed     Newton Morfin [facility]  12/28/22 1188

## 2022-12-28 NOTE — ED TRIAGE NOTES
Pt to ED after feeling like she was being chased and feeling like people are staring at her. Pt was at [address] [facility] and stated that she felt like people were looking at her and then texting other people. Pt requested the pharmacy to call 911. Pt was sent for psychiatric evaluation after speaking with [employer].

## 2022-12-28 NOTE — ED NOTES
[facility] notified of need for 3 WT escort & Patient belongings     Radha [facility]  12/28/22 5588

## 2022-12-28 NOTE — ED NOTES
when attempting to move pt from main ED to [facility], pt became verbally aggressive, raising her voice at staff, and becoming accusatory. Patient is paranoid of staff, patient postures at times. Patient is resistive to care, even when explained that she has been pink slipped and needs to have a psych eval done, patient stated \"Im not paranoid, I have people trying to kill me, you all are aggressive right now. \" Patient is hypervigiliant, watching each move with intense eye contact. Patient was ordered medications and received per mar.       Sumaya Notice, RN  12/28/22 0028

## 2022-12-28 NOTE — ED NOTES
Travis Herrera PA-C notified of disposition by Romayne Norris for [facility] admission     Rufino Rojo RN  12/28/22 2681

## 2022-12-28 NOTE — ED NOTES
Talked with Poppy at [facility] and per [employer] will be out to assess the pt for a possible indigent bed.      Elsie Sherman RN  12/28/22 8840

## 2022-12-28 NOTE — ED NOTES
Provisional Diagnosis:     Paranoid    Psychosocial and Contextual Factors:  Patient originally is from [place]. Moved to [facility] to be with her bf Malvin Aguilar (relationship ended). Went to visit family over the holiday in hopes they would be supportive, but claims they were not. Patient graduated high school 2014, attended some college but did not graduate. Never , one pregnancy aborted at age of 25. Recently lost job as a . C-SSRS Summary:   Details:   C-SSRS Suicide Screening - 1) Within the past month, have you wished you were dead or wished you could go to sleep and not wake up? : No  2) Have you actually had any thoughts of killing yourself? : No  6) Have you ever done anything, started to do anything, or prepared to do anything to end your life?: No  Risk of Suicide: No Risk     Patient: Alert and oriented. Hypervigilant, irritable and  labile. Family: None present. Mother and grandmother called. Agency: Denies mental health treatment. Reports only seeing a weight loss doctor for all medical needs. Substance Abuse: Reports recreational drug use in the past as a teen, denies current. Denies current drug use/abuse. Reports drinking beer and wine but \"not too often\" Last use was wine on [place]. Denies daily drinking. Present Suicidal Behavior:      Verbal: Denies    Attempt: denies    Past Suicidal Behavior:     Verbal: Denies    Attempt:Denies    Self-Injurious/Self-Mutilation: Denies    Violence Current or Past: Denies     Trauma Identified:  Refused to discuss     Protective Factors:    Intelligent  Finds support from her cat - with her in ER    Risk Factors:    Lacks insight into mental health symptoms   Limited supports     Clinical Summary:    America Garibay is a 32 y.o. female presents to the ED via EMS (with her cat). Reports returning from [facility] after visiting family for the holiday. Stopped in [facility] to shop.  At [facility], planned to buy a new phone because she was getting google alerts from her phone telling her she was in danger, reported 2 black men were following her and wanted to harm her. Admists she made a scene at the check out. She notified the pharmacist to call 911. Sees a relationship between the 's name being Carrie Villatoro which is the same as her mother. She admits to having an \"episode of anxiety, paranoia and fear\" but denies a diagnosis of schizophrenia and wants to know where ER is getting this information. Per ED, pt's mother was on the phone during the initial interview, mother reported the patient has \"a diagnosis of schizophrenia and needs psychiatric help\". Patient verbally revolked release for her mother and only wants staff to talk with her Dez Rhiannonramiro [phone]. Grandmother is not aware of psych history. Claims her sister was supportive but now \"pretends like she does not know\"    Patient has been living in [facility]. Moved from [place] to be with her boyfriend [place]. The relationship ended because of constant power struggles. \"It was like the devil and death standing over the table and each would never give up who will control hell\". Went to visit family over [place] to see if she might return to live there. Claims the visit went poorly. They were \"calling me names, ignoring me and bringing me down\"    Pt presents irritable, hyperactive, rapid pressured speech. Mood is anxious and labile. Paranoid delusions of people being out to get her. Upset and agitated about being held against her will. Denies SI/HI and hallucinations. Claims she is \"Denominational, spiritual and a psychic\" and her family caused the paranoia.   \"I was forced to take drugs here today like [employer]\"      Patient reports taking Buspar from her PCP, denies being connected with a psychiatrist or therapist. I later spoke on the phone with mother's best friend who states change in behavior x1 month, continued paranoid delusions for the past month, persecutory delusions. Recently packed a suitcase, hopped on a bus and came to [place]. She is from [place]. Believes family members in [place] are conspiring against her. Patient stated to mother's best friend that she has had to drive 80 mph on the highway d/t people following her. Father is diagnosed with schizophrenia. Denies friedman issues,refused to discuss trauma history. Reports history of DVT and pulmonary embolism. Was last admitted medically in September.     Level of Care Disposition:    Discuss with Dr Cecilia Patterson, RN  12/28/22 6898

## 2022-12-28 NOTE — ED NOTES
Pt was given juice at the bedside she is quiet and cooperative aware of the need for urine to be sent to the lab.      Marylin Adame RN  12/28/22 0186

## 2022-12-28 NOTE — PLAN OF CARE
Patient arrived to the [facility] from the [facility] in a wheelchair. Patient is alert and oriented x4, she is somewhat cooperative for intake assessment. Patient is very tired and falling asleep. When asked why she is here patient states,\"to save myself\" when asked to elaborate, patient states,\"It was all foggy and I remember getting into the ambulance [facility], and now I was tricked and forced to stay\" Patient states she has never tried to kill herself and denies SI. She states she was not sure what happened at [facility]. Patient is very evasive of questions, answering \"I don't know\" or just not responding. Patient is oriented to the room, refuses full tour of the unit, but unit policies and procedures explained. Patient refuses to come out of her room for dinner, and declined anything to eat for dinner. Patient reports having a hx of a DVT in September 2021. Patient reports taking phentermine and states she uses a [address] in [facility]. This pharmacy was unable to be contacted. Patient is a very poor historian at this time and is unwilling to continue assessment related to being tired. Problem: ABCDS Injury Assessment  Goal: Absence of physical injury  Outcome: Progressing     Problem: Risk for Elopement  Goal: Patient will not exit the unit/facility without proper excort  Outcome: Progressing  Flowsheets  Taken 12/28/2022 1744  Nursing Interventions for Elopement Risk: Communicate to physician the risk for elopement  Taken 12/28/2022 1724  Nursing Interventions for Elopement Risk: Communicate to physician the risk for elopement     Problem: Coping  Goal: Pt/Family able to verbalize concerns and demonstrate effective coping strategies  Description: INTERVENTIONS:  1. Assist patient/family to identify coping skills, available support systems and cultural and spiritual values  2.  Provide emotional support, including active listening and acknowledgement of concerns of patient and caregivers  3. Reduce environmental stimuli, as able  4. Instruct patient/family in relaxation techniques, as appropriate  5. Assess for spiritual pain/suffering and initiate Spiritual Care, Psychosocial Clinical Specialist consults as needed  Outcome: Progressing     Problem: Confusion  Goal: Confusion, delirium, dementia, or psychosis is improved or at baseline  Description: INTERVENTIONS:  1. Assess for possible contributors to thought disturbance, including medications, impaired vision or hearing, underlying metabolic abnormalities, dehydration, psychiatric diagnoses, and notify attending LIP  2. [place] high risk fall precautions, as indicated  3. Provide frequent short contacts to provide reality reorientation, refocusing and direction  4. Decrease environmental stimuli, including noise as appropriate  5. Monitor and intervene to maintain adequate nutrition, hydration, elimination, sleep and activity  6. If unable to ensure safety without constant attention obtain sitter and review sitter guidelines with assigned personnel  7. Initiate Psychosocial CNS and Spiritual Care consult, as indicated  Outcome: Progressing     Problem: Behavior  Goal: Pt/Family maintain appropriate behavior and adhere to behavioral management agreement, if implemented  Description: INTERVENTIONS:  1. Assess patient/family's coping skills and  non-compliant behavior (including use of illegal substances)  2. Notify security of behavior or suspected illegal substances which indicate the need for search of the family and/or belongings  3. Encourage verbalization of thoughts and concerns in a socially appropriate manner  4. Utilize positive, consistent limit setting strategies supporting safety of patient, staff and others  5. Encourage participation in the decision making process about the behavioral management agreement  6. If a visitor's behavior poses a threat to safety call refer to organization policy.   7. Initiate consult with , Psychosocial CNS, Spiritual Care as appropriate  Outcome: Progressing

## 2022-12-28 NOTE — ED NOTES
Nursing report called to Ivory Mcduffie on [address], [facility]  12/28/22 2453

## 2022-12-28 NOTE — ED NOTES
Spoke with [employer], they do not take cats. Suggested to hold cat til AM and take to [place] [facility].       Corina Guerrero RN  12/27/22 1937

## 2022-12-28 NOTE — ED NOTES
Patient appears to be  sleeping respirations are even and unlabored no distress noted at this time.        Khanh Deleon RN  12/28/22 5554

## 2022-12-29 PROCEDURE — 6370000000 HC RX 637 (ALT 250 FOR IP): Performed by: PSYCHIATRY & NEUROLOGY

## 2022-12-29 PROCEDURE — 6370000000 HC RX 637 (ALT 250 FOR IP): Performed by: REGISTERED NURSE

## 2022-12-29 PROCEDURE — 1240000000 HC EMOTIONAL WELLNESS R&B

## 2022-12-29 RX ORDER — PALIPERIDONE 3 MG/1
3 TABLET, EXTENDED RELEASE ORAL
Status: DISCONTINUED | OUTPATIENT
Start: 2022-12-29 | End: 2022-12-31

## 2022-12-29 RX ORDER — PHENTERMINE HYDROCHLORIDE 37.5 MG/1
37.5 TABLET ORAL DAILY
Status: ON HOLD | COMMUNITY
End: 2023-01-03 | Stop reason: HOSPADM

## 2022-12-29 RX ORDER — GUAIFENESIN/DEXTROMETHORPHAN 100-10MG/5
5 SYRUP ORAL EVERY 4 HOURS PRN
Status: DISCONTINUED | OUTPATIENT
Start: 2022-12-29 | End: 2023-01-03 | Stop reason: HOSPADM

## 2022-12-29 RX ADMIN — GUAIFENESIN SYRUP AND DEXTROMETHORPHAN 5 ML: 100; 10 SYRUP ORAL at 13:19

## 2022-12-29 RX ADMIN — HYDROXYZINE PAMOATE 50 MG: 50 CAPSULE ORAL at 09:43

## 2022-12-29 RX ADMIN — PALIPERIDONE 3 MG: 3 TABLET, EXTENDED RELEASE ORAL at 17:10

## 2022-12-29 NOTE — CARE COORDINATION
Brief Intervention and Referral to Treatment Summary    Patient was provided PHQ-9, AUDIT-C and DAST Screening:      PHQ-9 Score: nc  AUDIT-C Score:  0  DAST Score:  0    Patients substance use is considered     Low Risk/Healthy X   Moderate Risk  Harmful  Dependent    Patients depression is considered:  nc     Minimal  Mild   Moderate  Moderately Severe  Severe    Brief Education Was Provided n/a     Patient was receptive  Patient was not receptive      Brief Intervention Is Provided (Only for AUDIT-C or DAST)  n/a     Patient reports readiness to decrease and/or stop use and a plan was discussed   Patient denies readiness to decrease and/or stop use and a plan was not discussed      Recommendations/Referrals for Brief and/or Specialized Treatment Provided to Patient     Pt uds and etoh are negative. Pt denies AOD use. Pt needs [facility].  Electronically signed by TERESSA Haque on 12/29/2022 at 12:00 PM

## 2022-12-29 NOTE — PLAN OF CARE
Pt remains isolative in room and reports feeling \"tired. \" During assessment, pt denies SI, HI, AVH. Denies physical pain and observed flat affect with poor eye contact. Does answers questions appropriately but appears unmotivated to discuss / create a plan beyond the hospital. Compliant with medications and procedures. Problem: ABCDS Injury Assessment  Goal: Absence of physical injury  Outcome: Progressing     Problem: Risk for Elopement  Goal: Patient will not exit the unit/facility without proper excort  Outcome: Progressing     Problem: Coping  Goal: Pt/Family able to verbalize concerns and demonstrate effective coping strategies  Description: INTERVENTIONS:  1. Assist patient/family to identify coping skills, available support systems and cultural and spiritual values  2. Provide emotional support, including active listening and acknowledgement of concerns of patient and caregivers  3. Reduce environmental stimuli, as able  4. Instruct patient/family in relaxation techniques, as appropriate  5. Assess for spiritual pain/suffering and initiate Spiritual Care, Psychosocial Clinical Specialist consults as needed  Outcome: Progressing     Problem: Confusion  Goal: Confusion, delirium, dementia, or psychosis is improved or at baseline  Description: INTERVENTIONS:  1. Assess for possible contributors to thought disturbance, including medications, impaired vision or hearing, underlying metabolic abnormalities, dehydration, psychiatric diagnoses, and notify attending LIP  2. [place] high risk fall precautions, as indicated  3. Provide frequent short contacts to provide reality reorientation, refocusing and direction  4. Decrease environmental stimuli, including noise as appropriate  5. Monitor and intervene to maintain adequate nutrition, hydration, elimination, sleep and activity  6. If unable to ensure safety without constant attention obtain sitter and review sitter guidelines with assigned personnel  7. Initiate Psychosocial CNS and Spiritual Care consult, as indicated  Outcome: Progressing     Problem: Behavior  Goal: Pt/Family maintain appropriate behavior and adhere to behavioral management agreement, if implemented  Description: INTERVENTIONS:  1. Assess patient/family's coping skills and  non-compliant behavior (including use of illegal substances)  2. Notify security of behavior or suspected illegal substances which indicate the need for search of the family and/or belongings  3. Encourage verbalization of thoughts and concerns in a socially appropriate manner  4. Utilize positive, consistent limit setting strategies supporting safety of patient, staff and others  5. Encourage participation in the decision making process about the behavioral management agreement  6. If a visitor's behavior poses a threat to safety call refer to organization policy.   7. Initiate consult with , Psychosocial CNS, Spiritual Care as appropriate  Outcome: Progressing
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
## 2022-12-29 NOTE — H&P
HISTORY AND PHYSICAL             Date: 12/29/2022        Patient Name: Will Lorenzo     YOB: 1995      Age:  32 y.o. Chief Complaint     Chief Complaint   Patient presents with    Paranoid     Pt contacted police stating she feels like she is being chased. Pt has history of paranoia. History Obtained From   patient    History of Present Illness   Patient is a 70-year-old female admitted to psych unit for experiencing hallucinations. Patient has a past medical history of paranoid schizophrenia per family and DVT. Denies history. Reports smoking, denies illicit drug use and alcohol use. Patient seen by this provider alert and oriented x 3, cooperative during interview. Patient also denies chest pain, palpitations, dizziness, shortness of breath, fever, chills, nausea vomiting, diarrhea and anorexia. Reports occasional dry cough. Acute distress observed    Past Medical History     Past Medical History:   Diagnosis Date    DVT (deep venous thrombosis) ([facility] 09/01/2021    patient reports DVT, states she had pain in her calf        Past Surgical History   No past surgical history on file. Medications Prior to Admission     Prior to Admission medications    Medication Sig Start Date End Date Taking? Authorizing Provider   phentermine (ADIPEX-P) 37.5 MG tablet Take 37.5 mg by mouth daily. Yes Historical Provider, MD        Allergies   Wellbutrin [bupropion]    Social History     Social History       Tobacco History       Smoking Status  Every Day Smoking Tobacco Type  Cigarettes      Smokeless Tobacco Use  Unknown              Alcohol History       Alcohol Use Status  Not Asked              Drug Use       Drug Use Status  Not Asked              Sexual Activity       Sexually Active  Not Asked                    Family History   No family history on file.     Review of Systems   12 point ROS reviewed and negative except as stated in HPI    Physical Exam   BP (!) 142/86   Pulse 100 Temp 98.8 °F (37.1 °C) (Oral)   Resp 16   Ht 5' 6\" (1.676 m)   Wt 240 lb (108.9 kg)   SpO2 94%   BMI 38.74 kg/m²     CONSTITUTIONAL:  awake, alert, cooperative, no apparent distress, and appears stated age  ENT:  Normocephalic, without obvious abnormality, atraumatic, sinuses nontender on palpation, external ears without lesions, oral pharynx with moist mucus membranes, tonsils without erythema or exudates, gums normal and good dentition. LUNGS:  No increased work of breathing, good air exchange, clear to auscultation bilaterally, no crackles or wheezing  CARDIOVASCULAR:  Normal apical impulse, regular rate and rhythm, normal S1 and S2, no S3 or S4, and no murmur noted  ABDOMEN:  No scars, normal bowel sounds, soft, non-distended, non-tender, no masses palpated, no hepatosplenomegally  MUSCULOSKELETAL:  There is no redness, warmth, or swelling of the joints. Full range of motion noted. Motor strength is 5 out of 5 all extremities bilaterally. Tone is normal.  NEUROLOGIC:  Awake, alert, oriented to name, place and time. Cranial nerves II-XII are grossly intact. Motor is 5 out of 5 bilaterally. Cerebellar finger to nose, heel to shin intact. Sensory is intact. Babinski down going, Romberg negative, and gait is normal.  SKIN:  normal skin color, texture, turgor and no redness, warmth, or swelling. Scratches to hands noted. Labs    No results found for this or any previous visit (from the past 24 hour(s)). Imaging/Diagnostics Last 24 Hours   No results found. Assessment      Hospital Problems             Last Modified POA    * (Principal) [facility], [facility]/28/2022 Yes       Plan   *Hallucinations/schizophrenia/paranoia  *Cough  Plan: Patient seen by this provider; medically appropriate for psych. Hallucinations, paranoia, and schizophrenia as reported in medical record and all by psychiatry. Not currently on any home medications.   Nicotine patch for smoking cessation. Robitussin-DM every 4 hours as needed for cough ordered. Plan of care reviewed with patient.   Time spent 35 minutes    Consultations Ordered:  IP CONSULT TO HOSPITALIST    Electronically signed by DEEPA Verdugo CNP on 12/29/22 at 12:02 PM EST

## 2022-12-29 NOTE — PROGRESS NOTES
Pt was awaken for phone call and cough syrup newly ordered, that she wanted states she is coughing.  Pt appears more relaxed, vistaril helpful  that was given this am

## 2022-12-29 NOTE — PLAN OF CARE
Problem: Coping  Goal: Pt/Family able to verbalize concerns and demonstrate effective coping strategies  Description: INTERVENTIONS:  1. Assist patient/family to identify coping skills, available support systems and cultural and spiritual values  2. Provide emotional support, including active listening and acknowledgement of concerns of patient and caregivers  3. Reduce environmental stimuli, as able  4. Instruct patient/family in relaxation techniques, as appropriate  5. Assess for spiritual pain/suffering and initiate Spiritual Care, Psychosocial Clinical Specialist consults as needed  12/28/2022 2021 by Jazmyn Hopkins RN  Outcome: Not Progressing  12/28/2022 1814 by Bakari Baron RN  Outcome: Progressing     Problem: Confusion  Goal: Confusion, delirium, dementia, or psychosis is improved or at baseline  Description: INTERVENTIONS:  1. Assess for possible contributors to thought disturbance, including medications, impaired vision or hearing, underlying metabolic abnormalities, dehydration, psychiatric diagnoses, and notify attending LIP  2. [place] high risk fall precautions, as indicated  3. Provide frequent short contacts to provide reality reorientation, refocusing and direction  4. Decrease environmental stimuli, including noise as appropriate  5. Monitor and intervene to maintain adequate nutrition, hydration, elimination, sleep and activity  6. If unable to ensure safety without constant attention obtain sitter and review sitter guidelines with assigned personnel  7. Initiate Psychosocial CNS and Spiritual Care consult, as indicated  12/28/2022 2021 by Jazmyn Hopkins RN  Outcome: Not Progressing  12/28/2022 1814 by Bakari Baron RN  Outcome: Progressing     Problem: Behavior  Goal: Pt/Family maintain appropriate behavior and adhere to behavioral management agreement, if implemented  Description: INTERVENTIONS:  1.  Assess patient/family's coping skills and  non-compliant behavior (including use of illegal substances)  2. Notify security of behavior or suspected illegal substances which indicate the need for search of the family and/or belongings  3. Encourage verbalization of thoughts and concerns in a socially appropriate manner  4. Utilize positive, consistent limit setting strategies supporting safety of patient, staff and others  5. Encourage participation in the decision making process about the behavioral management agreement  6. If a visitor's behavior poses a threat to safety call refer to organization policy.  7. Initiate consult with , Psychosocial CNS, Spiritual Care as appropriate  12/28/2022 2021 by Neha Palacio RN  Outcome: Not Progressing  12/28/2022 1814 by Savanna Sutton RN  Outcome: Progressing     Problem: Coping  Goal: Pt/Family able to verbalize concerns and demonstrate effective coping strategies  Description: INTERVENTIONS:  1. Assist patient/family to identify coping skills, available support systems and cultural and spiritual values  2. Provide emotional support, including active listening and acknowledgement of concerns of patient and caregivers  3. Reduce environmental stimuli, as able  4. Instruct patient/family in relaxation techniques, as appropriate  5. Assess for spiritual pain/suffering and initiate Spiritual Care, Psychosocial Clinical Specialist consults as needed  12/28/2022 2021 by Neha Palacio RN  Outcome: Not Progressing  12/28/2022 1814 by Savanna Sutton RN  Outcome: Progressing     Problem: Confusion  Goal: Confusion, delirium, dementia, or psychosis is improved or at baseline  Description: INTERVENTIONS:  1. Assess for possible contributors to thought disturbance, including medications, impaired vision or hearing, underlying metabolic abnormalities, dehydration, psychiatric diagnoses, and notify attending LIP  2. [place] high risk fall precautions, as indicated  3. Provide frequent short contacts to provide reality reorientation, refocusing and  direction  4. Decrease environmental stimuli, including noise as appropriate  5. Monitor and intervene to maintain adequate nutrition, hydration, elimination, sleep and activity  6. If unable to ensure safety without constant attention obtain sitter and review sitter guidelines with assigned personnel  7. Initiate Psychosocial CNS and Spiritual Care consult, as indicated  12/28/2022 2021 by Lyudmila Watson RN  Outcome: Not Progressing  12/28/2022 1814 by Sriram Houser RN  Outcome: Progressing     Problem: Behavior  Goal: Pt/Family maintain appropriate behavior and adhere to behavioral management agreement, if implemented  Description: INTERVENTIONS:  1. Assess patient/family's coping skills and  non-compliant behavior (including use of illegal substances)  2. Notify security of behavior or suspected illegal substances which indicate the need for search of the family and/or belongings  3. Encourage verbalization of thoughts and concerns in a socially appropriate manner  4. Utilize positive, consistent limit setting strategies supporting safety of patient, staff and others  5. Encourage participation in the decision making process about the behavioral management agreement  6. If a visitor's behavior poses a threat to safety call refer to organization policy. 7. Initiate consult with , Psychosocial CNS, Spiritual Care as appropriate  12/28/2022 2021 by Lyudmila Watson RN  Outcome: Not Progressing  12/28/2022 1814 by Sriram Houser RN  Outcome: Pt in bed awake. This writer approached pt to introduce self ,and complete PM assessment,though pt non compliant. Pt denies SI,HI,A/V hallucinations. Contracts for safety on the unit. Pt declined to answer any further questions. Pt kept eyes closed,evasive and guarded.

## 2022-12-29 NOTE — PROGRESS NOTES
Morning Community Meeting Topics    Tawanda Solo attended the morning community meeting on 12/29/22. Topics discussed today     [x] Introduction  Day of the week and date  Mask distribution  Current mask requirements  [x]Teams  Explanation of  Green and Blue team criteria  Nurses assigned to each team for today  Explanation about green and blue paper  Date  Patient's Name  Patient's Nurse  Goals  [x] Visitation  Announce the visiting hours for the day  Announce which team is allowed to have visitors for the day  Review any updated Covid 19 requirements for visitors during visitation  Vaccine Card or negative Covid test within 48 hours of visit  State Identification  Patients are reminded to alert the  at least 1 hour before visitation   [x] Unit Orientation  Coffee use  Phone location and etiquette  Shower locations  [place] and dryer location and process  Common area expectations  Staff rounds expectation  [x] Meals   Educate patient to the menu  The patient is encouraged to fill out the menu to get preferences at mealtime  The patient is educated that if they do not fill out the menu, they will get the standard tray  The coffee pot is decaf, patient encouraged to order regular coffee from menu.   Educate patient to the meal process  Patient encouraged to eat snacks provided twice daily  Snacks may stay in patient room     [x] Discharge Process  Discharge expectations  Fill out the survey after discharge   [x] Hygiene  Daily showers encouraged  Showers availability discussed   Daily dressing encouraged  Discussed wearing street clothing  Education provided on where to place linens and clothing  Linens in the hamper  personal clothing does not go into the linen hamper  [x] Group   Patient encouraged to attend group provided  Time of Group Meetings discussed  Gentle reminder that attendance is a Physician order  [x] Movement  Chair exercises completed  Stretching completed  Notes: Goal - \"To feel less tired\" Electronically signed by Myrna Seaman, [address] on 12/29/2022 at 9:54 AM

## 2022-12-29 NOTE — PROGRESS NOTES
Pt. refused to attend the 1000 skills group, despite staff encouragement.  Electronically signed by Stacy Kingston, [address] on 12/29/2022 at 11:25 AM

## 2022-12-29 NOTE — PROGRESS NOTES
Behavioral Services  Medicare Certification Upon Admission    I certify that this patient's inpatient psychiatric hospital admission is medically necessary for:    [x] (1) Treatment which could reasonably be expected to improve this patient's condition,       [x] (2) Or for diagnostic study;     AND     [x](2) The inpatient psychiatric services are provided while the individual is under the care of a physician and are included in the individualized plan of care.     Estimated length of stay/service 3-5    Plan for post-hospital care OP care    Electronically signed by Genia Portillo MD on 12/29/2022 at 10:23 AM

## 2022-12-29 NOTE — PROGRESS NOTES
Pt is noted upon the unit, reports #10 anxiety, feeling overwhelmed worried with all the things she feels she needs to to at home, pt stated she had a rental car that needs to go back among other things, pt denied any suicidal thoughts, offered and gave vistaril for generalized anxiety, pt reports depression and poor sleep , pt was encouraged to talk with doctor about those things since she is hear now. Pt was encouraged to call family members to see if they can help, pt reports being from [place].

## 2022-12-29 NOTE — H&P
[facility] - Department of Psychiatry    History and Physical - Adult         CHIEF COMPLAINT:  Paranoia, psychosis    History obtained from:  patient, electronic medical record    Patient was seen after discussing with the treatment team and reviewing the chart        CIRCUMSTANCES OF ADMISSION: Corrina Burt is a 32 y.o. female presents to the ED via EMS (with her cat). [facility] the [facility]. Stopped in [facility] to shop. At [facility], planned to buy a new phone because she was getting google alerts from her phone telling her she was in danger, reported 2 black men were following her and wanted to harm her. Admists she made a scene at the check out. She notified the pharmacist to call 911. Sees a relationship between the 's name being Claudean Coder which is the same as her mother. She admits to having an \"episode of anxiety, paranoia and fear\" but denies a diagnosis of schizophrenia and wants to know where ER is getting this information. Per ED, pt's mother was on the phone during the initial interview, mother reported the patient has \"a diagnosis of schizophrenia and needs psychiatric help\". Patient verbally revolked release for her mother and only wants staff to talk with her Dominic Beto [phone]. Grandmother is not aware of psych history. Claims her sister was supportive but now \"pretends like she does not know\"     Patient has been living in [facility]. Moved from [place] to be with her boyfriend [place]. The relationship ended because of constant power struggles. \"It was like the devil and death standing over the table and each would never give up who will control hell\". Went to visit family over [place] to see if she might return to live there. Claims the visit went poorly. They were \"calling me names, ignoring me and bringing me down\"     Pt presents irritable, hyperactive, rapid pressured speech. Mood is anxious and labile. Paranoid delusions of people being out to get her. Upset and agitated about being held against her will. Denies SI/HI and hallucinations. Claims she is \"Islam, spiritual and a psychic\" and her family caused the paranoia. \"I was forced to take drugs here today like Girl [employer]\"      Patient reports taking Buspar from her PCP, denies being connected with a psychiatrist or therapist. I later spoke on the phone with mother's best friend who states change in behavior x1 month, continued paranoid delusions for the past month, persecutory delusions. Recently packed a suitcase, hopped on a bus and came to [place]. She is from [place]. Believes family members in [place] are conspiring against her. Patient stated to mother's best friend that she has had to drive 80 mph on the highway d/t people following her. Father is diagnosed with schizophrenia. Denies friedman issues,refused to discuss trauma history. Reports history of DVT and pulmonary embolism. Was last admitted medically in September. HISTORY OF PRESENT ILLNESS:      The patient is a 32 y.o. single female lives at home alone, currently unemployed, previously employed part time with significant past history of depression and anxiety. Delusional- believed [employer] had a gas leak in which everyone was evacuated; confirmation this did not occur. Pt further believes family is plotting against her, as well as multiple people chasing her. Increased paranoia and delusional thought process. Per collateral obtained from mother in ED, reports increased over the last month. Reports she is not actively open with mental health treatment; states she saw a psychiatrist once in her early twenties however did not like them so did not return. Pt state PCP previously prescribed Wellbutrin in the past but she does not currently take medication.     Patient reports driving from [place] to [facility] where she currently lives in apartment alone  Recently quit job  Report previous episode of paranoia and currently feeling paranoid  Disheveled   Labile affect; irritable  Guarded, evasive a times   Poor sleep and appetite  Patient states her mother was telling lies on her because they do not get along and her mother is a nurse and thinks she knows everything    Patient ruminating on statement made by mother stating she is schizophrenia, stating I do not have that, I do not have schizophrenia. Stressors: unemployment, recent break up, family conflict    The patient is not currently receiving care for the above psychiatric illness. Medications Prior to Admission:   Medications Prior to Admission: phentermine (ADIPEX-P) 37.5 MG tablet, Take 37.5 mg by mouth daily. Compliance:yes     Psychiatric Review of Systems       Depression: denies      Magy or Hypomania:  no     Panic Attacks:  no     Phobias:  no     Obsessions and Compulsions:  no     PTSD : denies     Hallucinations:  no     Delusions:  yes - paranoia, bizarre    Substance Abuse History:  ETOH: denies    Marijuana: denies   Opiates: denies   Other Drugs: denies       Past Psychiatric History:  Prior Diagnosis:  pt states unknown but believes MDD and SEBASTIAN; per collateral obtained in ED via Mom, pt dx schizophrenia   Psychiatrist: Denies   Therapist:Denies   Hospitalization: no  Hx of Suicidal Attempts: no  Hx of violence:  no  ECT: no  Previous discontinued Psychiatric Med Trials: Wellbutrin, Phenermine (started taking 3 moths ago and currently taking), Buspar     Past Medical History:        Diagnosis Date    DVT (deep venous thrombosis) ([facility] 09/01/2021    patient reports DVT, states she had pain in her calf       Past Surgical History:    No past surgical history on file. Allergies: Wellbutrin [bupropion]    Family History  No family history on file.       Social History:  Born and Raised: [address] and currently lives in [address]:   \"[place]\"  Education: [employer]  Employment: Unemployed, not seeking work  Relationships: single  Children: no children  Current Support:  not sure      Legal Hx: none  Access to weapons?:  No      EXAMINATION:    REVIEW OF SYSTEMS:    ROS:  [x] All negative/unchanged except if checked.  Explain positive(checked items) below:  [] Constitutional  [] Eyes  [] Ear/Nose/Mouth/Throat  [] Respiratory  [] CV  [] GI  []   [] Musculoskeletal  [] Skin/Breast  [] Neurological  [] Endocrine  [] Heme/Lymph  [] Allergic/Immunologic    Explanation:     Vitals:  BP (!) 142/86   Pulse 100   Temp 98.8 °F (37.1 °C) (Oral)   Resp 16   Ht 5' 6\" (1.676 m)   Wt 240 lb (108.9 kg)   SpO2 94%   BMI 38.74 kg/m²      Neurologic Exam:   Muscle Strength & Tone: full ROM  Gait: normal gait   Involuntary Movements: No    Mental Status Examination:    Level of consciousness:  within normal limits   Appearance:  hospital attire, poor grooming, and poor hygiene  Behavior/Motor:  no abnormalities noted  Attitude toward examiner:  argumentative  Speech:  spontaneous, normal rate, and normal volume   Mood: constricted and decreased range  Affect:  mood incongruent  Thought processes:  circumstantial   Thought content:  Delusions:  paranoid, persecutory, and mood incongruent  Cognition:  oriented to person, place, and time   Attention & Concentration distractible  Memory intact  Insight poor   Judgement poor   Fund of Knowledge adequate    Mini Mental Status 30/30      DIAGNOSIS:     Psychotic disorder Not Otherwise Specified      R/O Organic psychosis  Schizophrenia  Schizophreniform  Delusional Disorder         RISK ASSESSMENT:    SUICIDE RISK ASSESSMENT: low  HOMICIDE: low  AGITATION/VIOLENCE: high  ELOPEMENT: high    LABS: REVIEWED TODAY:  Recent Labs     12/27/22 2045   WBC 9.4   HGB 14.5        Recent Labs     12/27/22 2045      K 3.6   CL 99   CO2 20   BUN 4*   CREATININE 0.69   GLUCOSE 98     Recent Labs     12/27/22 2045   BILITOT 0.7   ALKPHOS 100   AST 23   ALT 22     Lab Results Component Value Date/Time    LABAMPH Neg 12/27/2022 08:45 PM    BARBSCNU Neg 12/27/2022 08:45 PM    LABBENZ Neg 12/27/2022 08:45 PM    LABMETH Neg 12/27/2022 08:45 PM    OPIATESCREENURINE Neg 12/27/2022 08:45 PM    PHENCYCLIDINESCREENURINE Neg 12/27/2022 08:45 PM    ETOH <10 12/27/2022 08:45 PM     Lab Results   Component Value Date/Time    TSH 1.200 12/27/2022 08:45 PM     No results found for: LITHIUM  No results found for: VALPROATE, CBMZ  No results found for: LITHIUM, VALPROATE    FURTHER LABS ORDERED :      Radiology   No results found. EKG: TRACING REVIEWED    TREATMENT PLAN:    Risk Management:  elopement risk    This patient was assessed for Medical bed necessity for the following reason:  N/A    Collateral Information:  Will obtain collateral information from the family or friends. Will obtain medical records as appropriate from out patient providers  Will consult the hospitalist for a physical exam to rule out any co-morbid physical condition. Home medication Reconciled       New Medications started during this admission :    Initiate Invega 3mg QD  Prn Haldol 5mg and Vistaril 50mg q6hr for extreme agitation. Trazodone as ordered for insomnia  Vistaril as ordered for anxiety  Discussed with the patient risk, benefit, alternative and common side effects for the  proposed medication treatment. Patient is consenting to the treatment. Psychotherapy:   Encourage participation in milieu and group therapy  Individual therapy as needed      Electronically signed by DEEPA Ahumada CNP on 12/29/2022 at 10:19 AM     .Addendum:  Ptient seen with NP after attending the team meeting, discussing the patient with the nursing and social work staff. I participated during the interview process as well as the treatment plan and spent more than 70% of the time with the nurse practitioner helping me in documentation.   I agree with the above documentation of clinical finding and treatment plan    Physician Signature: Electronically signed by Danny Valdes MD on 12/29/2022 at 2:59 PM

## 2022-12-30 PROCEDURE — 6370000000 HC RX 637 (ALT 250 FOR IP): Performed by: REGISTERED NURSE

## 2022-12-30 PROCEDURE — 6370000000 HC RX 637 (ALT 250 FOR IP): Performed by: PSYCHIATRY & NEUROLOGY

## 2022-12-30 PROCEDURE — 1240000000 HC EMOTIONAL WELLNESS R&B

## 2022-12-30 PROCEDURE — 93005 ELECTROCARDIOGRAM TRACING: CPT | Performed by: PSYCHIATRY & NEUROLOGY

## 2022-12-30 RX ADMIN — PALIPERIDONE 3 MG: 3 TABLET, EXTENDED RELEASE ORAL at 19:10

## 2022-12-30 RX ADMIN — GUAIFENESIN SYRUP AND DEXTROMETHORPHAN 5 ML: 100; 10 SYRUP ORAL at 20:26

## 2022-12-30 NOTE — PROGRESS NOTES
Pt. declined to attend the 0900 community meeting, despite staff encouragement.  GOAL : \" to get numbers out of my phone\" Electronically signed by Tonio Patel on 12/30/2022 at 9:24 AM

## 2022-12-30 NOTE — PROGRESS NOTES
[facility] FOLLOW-UP NOTE       12/30/2022     Patient was seen and examined in person, Chart reviewed   Patient's case discussed with staff/team    Chief Complaint: Psychosis    Interim History:     Pt took her medication last night  Pt spoke with mom and grandmother  Accepting of her diagnosis and treatment  Only contact here in [place] was her ex BF  Pt want to stay here in [place] for next few months and think about other plans later  Pt is less irritable and paranoid today  Worrying about her cat and rental car parked at [address]  Pt want to get in touch with rental agency through an keshawn in her phone, for her to get in touch with rental agency  Appetite:   [x] Normal/Unchanged  [] Increased  [] Decreased      Sleep:       [] Normal/Unchanged  [x] Fair       [] Poor              Energy:    [x] Normal/Unchanged  [] Increased  [] Decreased        SI [] Present  [x] Absent    HI  []Present  [x] Absent     Aggression:  [] yes  [] no    Patient is [] able  [x] unable to CONTRACT FOR SAFETY     PAST MEDICAL/PSYCHIATRIC HISTORY:   Past Medical History:   Diagnosis Date    [facility]) [facility] 09/01/2021    patient reports DVT, states she had pain in her calf       FAMILY/SOCIAL HISTORY:  No family history on file.   Social History     Socioeconomic History    Marital status: Single     Spouse name: Not on file    Number of children: Not on file    Years of education: Not on file    Highest education level: Not on file   Occupational History    Not on file   Tobacco Use    Smoking status: Every Day     Types: Cigarettes    Smokeless tobacco: Not on file   Substance and Sexual Activity    Alcohol use: Not on file    Drug use: Not on file    Sexual activity: Not on file   Other Topics Concern    Not on file   Social History Narrative    Not on file     Social Determinants of Health     Financial Resource Strain: Not on file   Food Insecurity: Not on file   Transportation Needs: Not on file   Physical Activity: Not on file   Stress: Not on file   Social Connections: Not on file   Intimate Partner Violence: Not on file   Housing Stability: Not on file           ROS:  [x] All negative/unchanged except if checked. Explain positive(checked items) below:  [] Constitutional  [] Eyes  [] Ear/Nose/Mouth/Throat  [] Respiratory  [] CV  [] GI  []   [] Musculoskeletal  [] Skin/Breast  [] Neurological  [] Endocrine  [] Heme/Lymph  [] Allergic/Immunologic    Explanation:     MEDICATIONS:    Current Facility-Administered Medications:     paliperidone (INVEGA) extended release tablet 3 mg, 3 mg, Oral, Dinner, Dinesh Membreno MD, 3 mg at 12/29/22 1710    guaiFENesin-dextromethorphan (ROBITUSSIN DM) 100-10 MG/5ML syrup 5 mL, 5 mL, Oral, Q4H PRN, Jaleesa Esparza APRN - CNP, 5 mL at 12/29/22 1319    acetaminophen (TYLENOL) tablet 650 mg, 650 mg, Oral, Q4H PRN, Dinesh Membreno MD    polyethylene glycol (GLYCOLAX) packet 17 g, 17 g, Oral, Daily PRN, Dinesh Membreno MD    traZODone (DESYREL) tablet 50 mg, 50 mg, Oral, Nightly PRN, Dinesh Membreno MD    aluminum & magnesium hydroxide-simethicone (MAALOX) 200-200-20 MG/5ML suspension 30 mL, 30 mL, Oral, Q6H PRN, Dinesh Membreno MD    nicotine (NICODERM CQ) 14 MG/24HR 1 patch, 1 patch, TransDERmal, Daily, Dinesh Membreno MD, 1 patch at 12/30/22 0906    benztropine mesylate (COGENTIN) injection 2 mg, 2 mg, IntraMUSCular, BID PRN, Dinesh Membreno MD    hydrOXYzine pamoate (VISTARIL) capsule 50 mg, 50 mg, Oral, Q6H PRN, 50 mg at 12/29/22 0943 **OR** hydrOXYzine (VISTARIL) injection 50 mg, 50 mg, IntraMUSCular, Q6H PRN, Dinesh Membreno MD    haloperidol (HALDOL) tablet 5 mg, 5 mg, Oral, Q6H PRN **OR** haloperidol lactate (HALDOL) injection 5 mg, 5 mg, IntraMUSCular, Q6H PRN, Dinesh Membreno MD      Examination:  /87   Pulse 90   Temp 98.6 °F (37 °C) (Oral)   Resp 18   Ht 5' 6\" (1.676 m)   Wt 240 lb (108.9 kg)   SpO2 99%   BMI 38.74  kg/m²   Gait - steady  Medication side effects(SE): no    Mental Status Examination:    Level of consciousness:  within normal limits   Appearance:  fair grooming and fair hygiene  Behavior/Motor:  psychomotor retardation  Attitude toward examiner:  cooperative  Speech:  slow   Mood: dysthymic  Affect:  intense  Thought processes:  coherent   Thought content:  Delusions:  paranoid  Cognition:  oriented to person, place, and time   Concentration distractible  Insight poor   Judgement poor     ASSESSMENT:   Patient symptoms are:  [] Well controlled  [] Improving  [] Worsening  [] No change      Diagnosis:   Principal Problem:    Psychosis, unspecified psychosis type ([facility]  Resolved Problems:    * No resolved hospital problems. *      LABS:    Recent Labs     12/27/22 2045   WBC 9.4   HGB 14.5        Recent Labs     12/27/22 2045      K 3.6   CL 99   CO2 20   BUN 4*   CREATININE 0.69   GLUCOSE 98     Recent Labs     12/27/22 2045   BILITOT 0.7   ALKPHOS 100   AST 23   ALT 22     Lab Results   Component Value Date/Time    LABAMPH Neg 12/27/2022 08:45 PM    BARBSCNU Neg 12/27/2022 08:45 PM    LABBENZ Neg 12/27/2022 08:45 PM    LABMETH Neg 12/27/2022 08:45 PM    OPIATESCREENURINE Neg 12/27/2022 08:45 PM    PHENCYCLIDINESCREENURINE Neg 12/27/2022 08:45 PM    ETOH <10 12/27/2022 08:45 PM     Lab Results   Component Value Date/Time    TSH 1.200 12/27/2022 08:45 PM     No results found for: LITHIUM  No results found for: VALPROATE, CBMZ      Treatment Plan:  Reviewed current Medications with the patient. Medication as ordered  Risks, benefits, side effects, drug-to-drug interactions and alternatives to treatment were discussed. Collateral information:   CD evaluation  Encourage patient to attend group and other milieu activities.   Discharge planning discussed with the patient and treatment team.    PSYCHOTHERAPY/COUNSELING:  [x] Therapeutic interview  [x] Supportive  [] CBT  [] Ongoing  [] Other    [x] Patient continues to need, on a daily basis, active treatment furnished directly by or requiring the supervision of inpatient psychiatric personnel      Anticipated Length of stay:         Electronically signed by Jesus Morgan MD on 12/30/2022 at 10:31 AM

## 2022-12-30 NOTE — GROUP NOTE
Group Therapy Note    Date: 12/30/2022    Group Start Time: 1630  Group End Time: 3944  Group Topic: Wrap-Up    [facility]    Jerson nAton RN        Group Therapy Note    Attendees: 6/11       Patient's Goal:  To stay focused    Notes:  Progressing    Status After Intervention:  Unchanged    Participation Level:  Active Listener    Participation Quality: Appropriate      Speech:  normal      Thought Process/Content: Logical      Affective Functioning: Congruent      Mood: euthymic      Level of consciousness:  Alert      Response to Learning: Progressing to goal      Endings: None Reported    Modes of Intervention: Support      Discipline Responsible: Registered Nurse      Signature:  Jerson Anton RN

## 2022-12-30 NOTE — GROUP NOTE
Group Therapy Note    Date: 12/30/2022    Group Start Time: 1600  Group End Time: 36  Group Topic: Healthy Living/Wellness    [facility]    Cindy Burdick RN        Group Therapy Note    Attendees: 6/11       Patient's Goal:  Attend group    Notes:  Progressing    Status After Intervention:  Unchanged    Participation Level: None    Participation Quality: Appropriate      Speech:  normal      Thought Process/Content: Logical      Affective Functioning: Congruent      Mood: euthymic      Level of consciousness:  Alert      Response to Learning: Progressing to goal      Endings: None Reported    Modes of Intervention: Support      Discipline Responsible: Registered Nurse      Signature:  Cindy Burdick RN

## 2022-12-30 NOTE — PLAN OF CARE
Pt interaction has improved since yesterday. Brightened affect at times, improved eye contact but remains isolative in room. Pt endorses psychotic episode that occurred on 12/27 which required admission, previously denying. Pt was taking phentermine for an extensive time prior to admission, self reports this medication may have contributed to the psychotic episode. Pt was started on Invega 12/29 and compliant with no reported side affects - med education provided. Denies depression and anxiety. Denies physical pain. Denies SI, HI, AVH. Pt agreeable to move back to [place] to live with grandmother as a potential post discharge plan     Problem: ABCDS Injury Assessment  Goal: Absence of physical injury  Outcome: Progressing  Flowsheets (Taken 12/29/2022 2024 by Neelam Ortez RN)  Absence of Physical Injury: Implement safety measures based on patient assessment     Problem: Risk for Elopement  Goal: Patient will not exit the unit/facility without proper excort  Outcome: Progressing  Flowsheets (Taken 12/30/2022 1310)  Nursing Interventions for Elopement Risk:   Communicate to physician the risk for elopement   Reduce environmental triggers     Problem: Coping  Goal: Pt/Family able to verbalize concerns and demonstrate effective coping strategies  Description: INTERVENTIONS:  1. Assist patient/family to identify coping skills, available support systems and cultural and spiritual values  2. Provide emotional support, including active listening and acknowledgement of concerns of patient and caregivers  3. Reduce environmental stimuli, as able  4. Instruct patient/family in relaxation techniques, as appropriate  5.  Assess for spiritual pain/suffering and initiate Spiritual Care, Psychosocial Clinical Specialist consults as needed  Outcome: Progressing  Flowsheets (Taken 12/30/2022 1310)  Patient/family able to verbalize anxieties, fears, and concerns, and demonstrate effective coping:   Assist patient/family to identify coping skills, available support systems and cultural and spiritual values   Reduce environmental stimuli, as able   Provide emotional support, including active listening and acknowledgement of concerns of patient and caregivers     Problem: Confusion  Goal: Confusion, delirium, dementia, or psychosis is improved or at baseline  Description: INTERVENTIONS:  1. Assess for possible contributors to thought disturbance, including medications, impaired vision or hearing, underlying metabolic abnormalities, dehydration, psychiatric diagnoses, and notify attending LIP  2. [place] high risk fall precautions, as indicated  3. Provide frequent short contacts to provide reality reorientation, refocusing and direction  4. Decrease environmental stimuli, including noise as appropriate  5. Monitor and intervene to maintain adequate nutrition, hydration, elimination, sleep and activity  6. If unable to ensure safety without constant attention obtain sitter and review sitter guidelines with assigned personnel  7. Initiate Psychosocial CNS and Spiritual Care consult, as indicated  Outcome: Progressing  Flowsheets (Taken 12/30/2022 1310)  Effect of thought disturbance (confusion, delirium, dementia, or psychosis) are managed with adequate functional status:   Assess for contributors to thought disturbance, including medications, impaired vision or hearing, underlying metabolic abnormalities, dehydration, psychiatric diagnoses, notify [facility] high risk fall precautions, as indicated   Provide frequent short contacts to provide reality reorientation, refocusing and direction     Problem: Behavior  Goal: Pt/Family maintain appropriate behavior and adhere to behavioral management agreement, if implemented  Description: INTERVENTIONS:  1. Assess patient/family's coping skills and  non-compliant behavior (including use of illegal substances)  2.  Notify security of behavior or suspected illegal substances which indicate the need for search of the family and/or belongings  3. Encourage verbalization of thoughts and concerns in a socially appropriate manner  4. Utilize positive, consistent limit setting strategies supporting safety of patient, staff and others  5. Encourage participation in the decision making process about the behavioral management agreement  6. If a visitor's behavior poses a threat to safety call refer to organization policy.   7. Initiate consult with , Psychosocial CNS, Spiritual Care as appropriate  Outcome: Progressing  Flowsheets (Taken 12/30/2022 1310)  Patient/family maintains appropriate behavior and adheres to behavioral management agreement, if implemented:   Assess patient/familys coping skills and  non-compliant behavior (including use of illegal substances)   Encourage verbalization of thoughts and concerns in a socially appropriate manner   Notify security of behavior or suspected illegal substances which indicate the need for search of the patient and/or belongings

## 2022-12-30 NOTE — PLAN OF CARE
Patient is isolative to room. Pt reports feeling tired all day. Pt reports showering and attended one group this AM. Patient reports good appetite. Pt has poor eye contact and appears guarded and flat. Pt rates anxiety and depression an 8/10 on a scale where 10 is the highest. Patient is vague with assessment questions. Pt denies SI,HI and hallucinations. Patient did not appear interested in taking her Invega at dinner but was encouraged to try it since it was newly prescribed today. Problem: ABCDS Injury Assessment  Goal: Absence of physical injury  12/29/2022 2024 by Efe Michaels RN  Outcome: Progressing  Flowsheets (Taken 12/29/2022 2024)  Absence of Physical Injury: Implement safety measures based on patient assessment  12/29/2022 1722 by Abhinav Barbosa RN  Outcome: Progressing     Problem: Risk for Elopement  Goal: Patient will not exit the unit/facility without proper excort  12/29/2022 2024 by Efe Michaels RN  Outcome: Jack Epstein (Taken 12/29/2022 1819 by Abhinav Barbosa RN)  Nursing Interventions for Elopement Risk:   Communicate to physician the risk for elopement   Reduce environmental triggers  12/29/2022 1722 by Abhinav Barbosa RN  Outcome: Progressing     Problem: Coping  Goal: Pt/Family able to verbalize concerns and demonstrate effective coping strategies  Description: INTERVENTIONS:  1. Assist patient/family to identify coping skills, available support systems and cultural and spiritual values  2. Provide emotional support, including active listening and acknowledgement of concerns of patient and caregivers  3. Reduce environmental stimuli, as able  4. Instruct patient/family in relaxation techniques, as appropriate  5.  Assess for spiritual pain/suffering and initiate Spiritual Care, Psychosocial Clinical Specialist consults as needed  12/29/2022 2024 by Efe Michaels RN  Outcome: Progressing  Flowsheets  Taken 12/29/2022 2024 by Efe Michaels RN  Patient/family able to verbalize anxieties, fears, and concerns, and demonstrate effective coping:   Assist patient/family to identify coping skills, available support systems and cultural and spiritual values   Instruct patient/family in relaxation techniques, as appropriate   Reduce environmental stimuli, as able  Taken 12/29/2022 1819 by Bijal Roque RN  Patient/family able to verbalize anxieties, fears, and concerns, and demonstrate effective coping:   Assist patient/family to identify coping skills, available support systems and cultural and spiritual values   Provide emotional support, including active listening and acknowledgement of concerns of patient and caregivers   Reduce environmental stimuli, as able  12/29/2022 1722 by Bijal Roque RN  Outcome: Progressing     Problem: Confusion  Goal: Confusion, delirium, dementia, or psychosis is improved or at baseline  Description: INTERVENTIONS:  1. Assess for possible contributors to thought disturbance, including medications, impaired vision or hearing, underlying metabolic abnormalities, dehydration, psychiatric diagnoses, and notify attending LIP  2. [place] high risk fall precautions, as indicated  3. Provide frequent short contacts to provide reality reorientation, refocusing and direction  4. Decrease environmental stimuli, including noise as appropriate  5. Monitor and intervene to maintain adequate nutrition, hydration, elimination, sleep and activity  6. If unable to ensure safety without constant attention obtain sitter and review sitter guidelines with assigned personnel  7.  Initiate Psychosocial CNS and Spiritual Care consult, as indicated  12/29/2022 2024 by Vaishali Robertson RN  Outcome: Progressing  Flowsheets  Taken 12/29/2022 2024 by Vaishali Robertson RN  Effect of thought disturbance (confusion, delirium, dementia, or psychosis) are managed with adequate functional status:   Provide frequent short contacts to provide reality reorientation, refocusing and direction   Monitor and intervene to maintain adequate nutrition, hydration, elimination, sleep and activity   [place] high risk fall precautions, as indicated   Assess for contributors to thought disturbance, including medications, impaired vision or hearing, underlying metabolic abnormalities, dehydration, psychiatric diagnoses, notify LIP  Taken 12/29/2022 1819 by Emily Chery RN  Effect of thought disturbance (confusion, delirium, dementia, or psychosis) are managed with adequate functional status:   Assess for contributors to thought disturbance, including medications, impaired vision or hearing, underlying metabolic abnormalities, dehydration, psychiatric diagnoses, notify LIP   Monitor and intervene to maintain adequate nutrition, hydration, elimination, sleep and activity  12/29/2022 1722 by Emily Chery RN  Outcome: Progressing     Problem: Behavior  Goal: Pt/Family maintain appropriate behavior and adhere to behavioral management agreement, if implemented  Description: INTERVENTIONS:  1. Assess patient/family's coping skills and  non-compliant behavior (including use of illegal substances)  2. Notify security of behavior or suspected illegal substances which indicate the need for search of the family and/or belongings  3. Encourage verbalization of thoughts and concerns in a socially appropriate manner  4. Utilize positive, consistent limit setting strategies supporting safety of patient, staff and others  5. Encourage participation in the decision making process about the behavioral management agreement  6. If a visitor's behavior poses a threat to safety call refer to organization policy.   7. Initiate consult with , Psychosocial CNS, Spiritual Care as appropriate  12/29/2022 2024 by Kimmie Hernandez RN  Outcome: Progressing  Flowsheets  Taken 12/29/2022 2024 by Kimmie Hernandez RN  Patient/family maintains appropriate behavior and adheres to behavioral management agreement, if implemented:   Notify security of behavior or suspected illegal substances which indicate the need for search of the patient and/or belongings   Assess patient/familys coping skills and  non-compliant behavior (including use of illegal substances)   Utilize positive, consistent limit setting strategies supporting safety of patient, staff and others  Taken 12/29/2022 1819 by Pb Chirinos RN  Patient/family maintains appropriate behavior and adheres to behavioral management agreement, if implemented:   Assess patient/familys coping skills and  non-compliant behavior (including use of illegal substances)   Encourage verbalization of thoughts and concerns in a socially appropriate manner  12/29/2022 1722 by Pb Chirinos RN  Outcome: Progressing

## 2022-12-30 NOTE — CARE COORDINATION
FAMILY COLLATERAL NOTE    Family/Support Name: Clara Atkins #:[phone]  Relationship to Pt[de-identified] grandma         Family/Support contact aware of hospitalization:yes  Presenting Symptoms/Current Concerns:When patient left [place] to come to [place] she said someone was stalking her. She came home for  and stayed with grandma and was OK. When mom and sister picked her up from the bus station she said , \" look that may be the cliff that was stalking me. \"   On xmas rosario patient drank a lot of wine and got mad at [facility] because she wanted to take grandma's car and she would not let her so patient called an ubber and went to [facility] and mom told her some things and patient got upset. Patient did not sleep at her moms. She then rented the car in [place]. Top 3 Life Stressors:   Boyfriends mother was dying  Dora Darby passed away a year ago  Patient talks about death a lot      Background History Relevant to Current Hospitalization:  Patient has always been very dramatic. She is also very smart. 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 Paranoia behavior is all brand new. Patient had an issue with blood clots  If patient stayed in school she would be graduating in may 2023 for acting. Patient works at a [employer] station in [place] and was doing a great job. Things started falling apart with the . 's moms  is today. Grandma told patient and patient just got quiet. Family Mental Health/Substance Use History:   Dad was diagnosed with Schizophrenia - patient and family did not have a lot of contact with him. Support Network's Goal for Hospitalization: to be stable and keep taking her meds     Discharge Plan: patient needs to determine where she is going after discharge.    She will need a doctors apptmt  Apartment is in both BF and patients name and the rent is paid for December but unsure if it will be paid for January but if patient can stay in [facility] for a few week and get her things together and her mom can pick her up or BF can drive her back to [place]. Support Network Supportive of Discharge Plan: yes      Support can confirm Safety of Location and Security of Weapons:   Patient has no weapons and grandma has no weapons    Support agreeable to [employer] and Monitor Medications (including Prescription and OTC): educated on medications and family encouraging patient to take meds and follow up with tx.      Identified Barriers to Compliance with Discharge Plan:   Unsure if patient will continue to take the medications  Recommendations for Support Network:   [facility] if any questions       Bob Simmons [facility]

## 2022-12-31 LAB
EKG ATRIAL RATE: 89 BPM
EKG P AXIS: 49 DEGREES
EKG P-R INTERVAL: 140 MS
EKG Q-T INTERVAL: 386 MS
EKG QRS DURATION: 88 MS
EKG QTC CALCULATION (BAZETT): 469 MS
EKG R AXIS: -44 DEGREES
EKG T AXIS: 18 DEGREES
EKG VENTRICULAR RATE: 89 BPM

## 2022-12-31 PROCEDURE — 1240000000 HC EMOTIONAL WELLNESS R&B

## 2022-12-31 PROCEDURE — 6370000000 HC RX 637 (ALT 250 FOR IP): Performed by: PSYCHIATRY & NEUROLOGY

## 2022-12-31 PROCEDURE — 6370000000 HC RX 637 (ALT 250 FOR IP): Performed by: REGISTERED NURSE

## 2022-12-31 RX ADMIN — PALIPERIDONE 4.5 MG: 3 TABLET, EXTENDED RELEASE ORAL at 16:59

## 2022-12-31 RX ADMIN — HYDROXYZINE PAMOATE 50 MG: 50 CAPSULE ORAL at 16:16

## 2022-12-31 RX ADMIN — GUAIFENESIN SYRUP AND DEXTROMETHORPHAN 5 ML: 100; 10 SYRUP ORAL at 08:59

## 2022-12-31 RX ADMIN — GUAIFENESIN SYRUP AND DEXTROMETHORPHAN 5 ML: 100; 10 SYRUP ORAL at 04:18

## 2022-12-31 NOTE — GROUP NOTE
Group Therapy Note    Date: 12/31/2022    Group Start Time: 1000  Group End Time: 1100  Group Topic: Psychoeducation    [facility]    Emma Murillo, CTRS        Group Therapy Note    Attendees: 7       Patient's Goal:  \"to have a solid plan for after dc\"    Notes:  Pt. attended the 1000 skill group. Pt. worked on project with interest. Luis Andre. Relaxed. Would like to do activities like this after dc. Status After Intervention:  Improved    Participation Level:  Active Listener and Minimal    Participation Quality: Appropriate, Attentive, and Sharing      Speech:  normal      Thought Process/Content: Logical      Affective Functioning: Congruent      Mood:  calm      Level of consciousness:  Alert, Oriented x4, and Attentive      Response to Learning: Progressing to goal      Endings: None Reported    Modes of Intervention: Education, Support, Socialization, and Activity      Discipline Responsible: Psychoeducational Specialist      Signature:  Oliverio Guerrero

## 2022-12-31 NOTE — PROGRESS NOTES
Morning Community Meeting Topics    Jarrett Lou attended the morning community meeting on 12/31/22. Topics discussed today     [x] Introduction  Day of the week and date  Mask distribution  Current mask requirements  [x]Teams  Explanation of  Green and Blue team criteria  Nurses assigned to each team for today  Explanation about green and blue paper  Date  Patient's Name  Patient's Nurse  Goals  [x] Visitation  Announce the visiting hours for the day  Announce which team is allowed to have visitors for the day  Review any updated Covid 19 requirements for visitors during visitation  Vaccine Card or negative Covid test within 48 hours of visit  State Identification  Patients are reminded to alert the  at least 1 hour before visitation   [x] Unit Orientation  Coffee use  Phone location and etiquette  Shower locations  [place] and dryer location and process  Common area expectations  Staff rounds expectation  [x] Meals   Educate patient to the menu  The patient is encouraged to fill out the menu to get preferences at mealtime  The patient is educated that if they do not fill out the menu, they will get the standard tray  The coffee pot is decaf, patient encouraged to order regular coffee from menu.   Educate patient to the meal process  Patient encouraged to eat snacks provided twice daily  Snacks may stay in patient room     [x] Discharge Process  Discharge expectations  Fill out the survey after discharge   [x] Hygiene  Daily showers encouraged  Showers availability discussed   Daily dressing encouraged  Discussed wearing street clothing  Education provided on where to place linens and clothing  Linens in the hamper  personal clothing does not go into the linen hamper  [x] Group   Patient encouraged to attend group provided  Time of Group Meetings discussed  Gentle reminder that attendance is a Physician order  [x] Movement  Chair exercises completed  Stretching completed  Notes:  GOAL : \" to have a solid plan for after dc\" Electronically signed by Adrianna Draper on 12/31/2022 at 12:34 PM

## 2022-12-31 NOTE — PLAN OF CARE
Pt visible on the unit at times, bright on approach, reported good sleep/appetite, reported anxiety/depression 4/10, washed face/hair today, doesn't recall LBM, will advise if she hasn't has a BM by the end of the night. Pt stated she noticed she has been defensive and doesn't understand why. Pt stated now she feels better, has clarity of mind. Pt's stressor is obtaining personal belongings from a rental car she left at [employer] parking lot. Pt stated she would like her phone to txt the car rental place to get her belongings. Pt advised to confirm with the dr. Danyell Mills denies SI/HI/AVH, denies further needs. Problem: ABCDS Injury Assessment  Goal: Absence of physical injury  [id number] by Eloy Burgess RN  Outcome: Progressing  12/30/2022 1340 by Maryan Mckee RN  Outcome: Progressing  Flowsheets (Taken 12/29/2022 2024 by Ruben Riggs RN)  Absence of Physical Injury: Implement safety measures based on patient assessment     Problem: Risk for Elopement  Goal: Patient will not exit the unit/facility without proper excort  [id number] by Eloy Burgess RN  Outcome: Progressing  12/30/2022 1340 by Maryan Mckee RN  Outcome: Progressing  Flowsheets (Taken 12/30/2022 1310)  Nursing Interventions for Elopement Risk:   Communicate to physician the risk for elopement   Reduce environmental triggers     Problem: Coping  Goal: Pt/Family able to verbalize concerns and demonstrate effective coping strategies  Description: INTERVENTIONS:  1. Assist patient/family to identify coping skills, available support systems and cultural and spiritual values  2. Provide emotional support, including active listening and acknowledgement of concerns of patient and caregivers  3. Reduce environmental stimuli, as able  4. Instruct patient/family in relaxation techniques, as appropriate  5.  Assess for spiritual pain/suffering and initiate Spiritual Care, Psychosocial Clinical Specialist consults as needed  12/30/2022 1926 by Dino Lemos RN  Outcome: Progressing  12/30/2022 1340 by Dori Morrison RN  Outcome: Progressing  Flowsheets (Taken 12/30/2022 1310)  Patient/family able to verbalize anxieties, fears, and concerns, and demonstrate effective coping:   Assist patient/family to identify coping skills, available support systems and cultural and spiritual values   Reduce environmental stimuli, as able   Provide emotional support, including active listening and acknowledgement of concerns of patient and caregivers     Problem: Confusion  Goal: Confusion, delirium, dementia, or psychosis is improved or at baseline  Description: INTERVENTIONS:  1. Assess for possible contributors to thought disturbance, including medications, impaired vision or hearing, underlying metabolic abnormalities, dehydration, psychiatric diagnoses, and notify attending LIP  2. [place] high risk fall precautions, as indicated  3. Provide frequent short contacts to provide reality reorientation, refocusing and direction  4. Decrease environmental stimuli, including noise as appropriate  5. Monitor and intervene to maintain adequate nutrition, hydration, elimination, sleep and activity  6. If unable to ensure safety without constant attention obtain sitter and review sitter guidelines with assigned personnel  7.  Initiate Psychosocial CNS and Spiritual Care consult, as indicated  [id number] by Dino Lemos RN  Outcome: Progressing  12/30/2022 1340 by Dori Morrison RN  Outcome: Progressing  Flowsheets (Taken 12/30/2022 1310)  Effect of thought disturbance (confusion, delirium, dementia, or psychosis) are managed with adequate functional status:   Assess for contributors to thought disturbance, including medications, impaired vision or hearing, underlying metabolic abnormalities, dehydration, psychiatric diagnoses, notify [facility] high risk fall precautions, as indicated   Provide frequent short contacts to provide reality reorientation, refocusing and direction     Problem: Behavior  Goal: Pt/Family maintain appropriate behavior and adhere to behavioral management agreement, if implemented  Description: INTERVENTIONS:  1. Assess patient/family's coping skills and  non-compliant behavior (including use of illegal substances)  2. Notify security of behavior or suspected illegal substances which indicate the need for search of the family and/or belongings  3. Encourage verbalization of thoughts and concerns in a socially appropriate manner  4. Utilize positive, consistent limit setting strategies supporting safety of patient, staff and others  5. Encourage participation in the decision making process about the behavioral management agreement  6. If a visitor's behavior poses a threat to safety call refer to organization policy.   7. Initiate consult with , Psychosocial CNS, Spiritual Care as appropriate  [id number] by Maisha Calle RN  Outcome: Progressing  12/30/2022 1340 by Doron Littlejohn RN  Outcome: Progressing  Flowsheets (Taken 12/30/2022 1310)  Patient/family maintains appropriate behavior and adheres to behavioral management agreement, if implemented:   Assess patient/familys coping skills and  non-compliant behavior (including use of illegal substances)   Encourage verbalization of thoughts and concerns in a socially appropriate manner   Notify security of behavior or suspected illegal substances which indicate the need for search of the patient and/or belongings

## 2022-12-31 NOTE — PLAN OF CARE
Patient reports adequate sleep and appetite. Patient is tending to ADL's adequately. Patent reports low depression and anxiety, denies suicidal/homicidal ideations, audio/visual hallucinations. Patient has been mostly isolating to her room this morning, did attend art group. Patient agrees to make needs known, will continue to monitor for changes in mood and behavior and provide support as needed. Problem: ABCDS Injury Assessment  Goal: Absence of physical injury  Outcome: Progressing     Problem: Risk for Elopement  Goal: Patient will not exit the unit/facility without proper excort  Outcome: Progressing  Flowsheets (Taken 12/31/2022 1033)  Nursing Interventions for Elopement Risk: Reduce environmental triggers     Problem: Coping  Goal: Pt/Family able to verbalize concerns and demonstrate effective coping strategies  Description: INTERVENTIONS:  1. Assist patient/family to identify coping skills, available support systems and cultural and spiritual values  2. Provide emotional support, including active listening and acknowledgement of concerns of patient and caregivers  3. Reduce environmental stimuli, as able  4. Instruct patient/family in relaxation techniques, as appropriate  5. Assess for spiritual pain/suffering and initiate Spiritual Care, Psychosocial Clinical Specialist consults as needed  Outcome: Progressing  Flowsheets (Taken 12/31/2022 1033)  Patient/family able to verbalize anxieties, fears, and concerns, and demonstrate effective coping: Provide emotional support, including active listening and acknowledgement of concerns of patient and caregivers     Problem: Confusion  Goal: Confusion, delirium, dementia, or psychosis is improved or at baseline  Description: INTERVENTIONS:  1. Assess for possible contributors to thought disturbance, including medications, impaired vision or hearing, underlying metabolic abnormalities, dehydration, psychiatric diagnoses, and notify attending LIP  2.  [place] high risk fall precautions, as indicated  3. Provide frequent short contacts to provide reality reorientation, refocusing and direction  4. Decrease environmental stimuli, including noise as appropriate  5. Monitor and intervene to maintain adequate nutrition, hydration, elimination, sleep and activity  6. If unable to ensure safety without constant attention obtain sitter and review sitter guidelines with assigned personnel  7. Initiate Psychosocial CNS and Spiritual Care consult, as indicated  Outcome: Progressing  Flowsheets (Taken 12/31/2022 1033)  Effect of thought disturbance (confusion, delirium, dementia, or psychosis) are managed with adequate functional status: Decrease environmental stimuli, including noise as appropriate     Problem: Behavior  Goal: Pt/Family maintain appropriate behavior and adhere to behavioral management agreement, if implemented  Description: INTERVENTIONS:  1. Assess patient/family's coping skills and  non-compliant behavior (including use of illegal substances)  2. Notify security of behavior or suspected illegal substances which indicate the need for search of the family and/or belongings  3. Encourage verbalization of thoughts and concerns in a socially appropriate manner  4. Utilize positive, consistent limit setting strategies supporting safety of patient, staff and others  5. Encourage participation in the decision making process about the behavioral management agreement  6. If a visitor's behavior poses a threat to safety call refer to organization policy.   7. Initiate consult with , Psychosocial CNS, Spiritual Care as appropriate  Outcome: Progressing  Flowsheets (Taken 12/31/2022 1033)  Patient/family maintains appropriate behavior and adheres to behavioral management agreement, if implemented: Encourage verbalization of thoughts and concerns in a socially appropriate manner

## 2023-01-01 PROCEDURE — 1240000000 HC EMOTIONAL WELLNESS R&B

## 2023-01-01 PROCEDURE — 6370000000 HC RX 637 (ALT 250 FOR IP): Performed by: PSYCHIATRY & NEUROLOGY

## 2023-01-01 RX ORDER — IBUPROFEN 800 MG/1
800 TABLET ORAL EVERY 12 HOURS PRN
Status: DISCONTINUED | OUTPATIENT
Start: 2023-01-01 | End: 2023-01-03 | Stop reason: HOSPADM

## 2023-01-01 RX ADMIN — PALIPERIDONE 4.5 MG: 3 TABLET, EXTENDED RELEASE ORAL at 17:02

## 2023-01-01 NOTE — PROGRESS NOTES
Pt's grandmother phoned (with correct HIPAA code) states pt is not acting like herself with her current medication regimen during hospitalization. Grandmother states pt is answering questions with 1 word answers and is \"blah\". Grandmother states pt is now no longer expressing concern about her rental car and does not seem to care about anything. Grandmother states she has been talking with pt's mother, who grandmother states is a nurse, and states pt has never taken medication for any mental health issues before to either of their knowledge. Family would like doctor to review pt's case, review the need for medication, and have SW or nursing staff to call family back tomorrow (Sunday) with an update.      Bruna-Grandmother- [phone]

## 2023-01-01 NOTE — PROGRESS NOTES
Pt out on unit,  social with select peers. Pt observed sitting in dayroom consuming lunch. Pt voiced, goal, \"to be positive. \" Pt reports showering today. Pt reports good appetite. Pt reports good sleep. Pt denies anxiety, depression, voiced, \"I feel ok. \"Pt reports attending groups. Pt denies SI, HI and A/V hallucinations. Will continue to monitor.

## 2023-01-01 NOTE — PROGRESS NOTES
Spoke with pt regarding phone call received from family and her grandmother's concerns. Pt states she feels fine and feels good on the current medication regimen. Pt states she is actually irritated with her grandma and mom d/t them acting concerned with pt at this time while she is here in [address] [place] [address], and states they are not usually as concerned for her otherwise. Pt states this is one reason she is giving family 1 word responses, and also because family is calling her frequently and she doesn't have much new to say to them. Pt states her family is causing her increased anxiety but feels better talking it through with staff. Pt denies current SI, HI, or AVH. Denies current feelings of depression.

## 2023-01-01 NOTE — PROGRESS NOTES
[facility] NOTE     12/31/22     Patient was seen and examined in person, Chart reviewed   Patient's case discussed with staff/team    Chief Complaint: psychoses, severe paranoia, drove from [place] to [place] , picked up by police in a [facility] parking lot  Per HandP:coped and italicized  CIRCUMSTANCES OF ADMISSION: Feroz Block is a 32 y.o. female presents to the ED via EMS (with her cat). Reports returning from [facility] after visiting family for the holiday. Stopped in [facility] to shop. At [facility], planned to buy a new phone because she was getting google alerts from her phone telling her she was in danger, reported 2 black men were following her and wanted to harm her. Admists she made a scene at the check out. She notified the pharmacist to call 911. Sees a relationship between the 's name being Fannie Galvan which is the same as her mother. She admits to having an \"episode of anxiety, paranoia and fear\" but denies a diagnosis of schizophrenia and wants to know where ER is getting this information. Per ED, pt's mother was on the phone during the initial interview, mother reported the patient has \"a diagnosis of schizophrenia and needs psychiatric help\". Patient verbally revolked release for her mother and only wants staff to talk with her Nely Shape [phone]. Grandmother is not aware of psych history. Claims her sister was supportive but now \"pretends like she does not know\"     Interim History:     Patient reports feeling better, she is less anxiuos, less paranoid  Says she needs her rental car situation solved. In general vague when speaking how she ended up in a hospital unsure of her next moves. Paranoia surfaces through her lack of details, irritability with family who are calling to check on her and inappropriate affect that accompanies her vague story as if she continues to carry a secret that no one else can find out.   Appetite:   [] Normal/Unchanged  [] Increased  [x] Decreased      Sleep:       [] Normal/Unchanged  [x] Fair       [] Poor            improved  Energy:    [] Normal/Unchanged  [] Increased  [x] Decreased        SI [] Present  [x] Absent    HI  []Present  [x] Absent     Aggression:  [] yes  [x] no    Patient is [] able   unable to CONTRACT FOR SAFETY     PAST MEDICAL/PSYCHIATRIC HISTORY:   Past Medical History:   Diagnosis Date    DVT (deep venous thrombosis) ([facility] [address] 09/01/2021    patient reports DVT, states she had pain in her calf       FAMILY/SOCIAL HISTORY:  No family history on file. Social History     Socioeconomic History    Marital status: Single     Spouse name: Not on file    Number of children: Not on file    Years of education: Not on file    Highest education level: Not on file   Occupational History    Not on file   Tobacco Use    Smoking status: Every Day     Types: Cigarettes    Smokeless tobacco: Not on file   Substance and Sexual Activity    Alcohol use: Not on file    Drug use: Not on file    Sexual activity: Not on file   Other Topics Concern    Not on file   Social History Narrative    Not on file     Social Determinants of Health     Financial Resource Strain: Not on file   Food Insecurity: Not on file   Transportation Needs: Not on file   Physical Activity: Not on file   Stress: Not on file   Social Connections: Not on file   Intimate Partner Violence: Not on file   Housing Stability: Not on file           ROS:  [x] All negative/unchanged except if checked.  Explain positive(checked items) below:  [] Constitutional  [] Eyes  [] Ear/Nose/Mouth/Throat  [] Respiratory  [] CV  [] GI  []   [] Musculoskeletal  [] Skin/Breast  [] Neurological  [] Endocrine  [] Heme/Lymph  [] Allergic/Immunologic    Explanation:     MEDICATIONS:    Current Facility-Administered Medications:     paliperidone (INVEGA) extended release tablet 4.5 mg, 4.5 mg, Oral, Dinner, Georgina Ching MD, 4.5 mg at 12/31/22 [phone] [id number] guaiFENesin-dextromethorphan (ROBITUSSIN DM) 100-10 MG/5ML syrup 5 mL, 5 mL, Oral, Q4H PRN, DEEPA Kirby - CNP, 5 mL at 12/31/22 0859    acetaminophen (TYLENOL) tablet 650 mg, 650 mg, Oral, Q4H PRN, Marika Whitten MD    polyethylene glycol (GLYCOLAX) packet 17 g, 17 g, Oral, Daily PRN, Marika Whitten MD    traZODone (DESYREL) tablet 50 mg, 50 mg, Oral, Nightly PRN, Marika Whitten MD    aluminum & magnesium hydroxide-simethicone (MAALOX) 200-200-20 MG/5ML suspension 30 mL, 30 mL, Oral, Q6H PRN, Marika Whitten MD    nicotine (NICODERM CQ) 14 MG/24HR 1 patch, 1 patch, TransDERmal, Daily, Marika Whitten MD, 1 patch at 12/30/22 0906    benztropine mesylate (COGENTIN) injection 2 mg, 2 mg, IntraMUSCular, BID PRN, Marika Whitten MD    hydrOXYzine pamoate (VISTARIL) capsule 50 mg, 50 mg, Oral, Q6H PRN, 50 mg at 12/31/22 1616 **OR** hydrOXYzine (VISTARIL) injection 50 mg, 50 mg, IntraMUSCular, Q6H PRN, Marika Whitten MD    haloperidol (HALDOL) tablet 5 mg, 5 mg, Oral, Q6H PRN **OR** haloperidol lactate (HALDOL) injection 5 mg, 5 mg, IntraMUSCular, Q6H PRN, Marika Whitten MD      Examination:  BP (!) 128/91   Pulse 85   Temp 98.6 °F (37 °C)   Resp 16   Ht 5' 6\" (1.676 m)   Wt 240 lb (108.9 kg)   SpO2 96%   BMI 38.74 kg/m²   Gait - steady  Medication side effects(SE): denies    Mental Status Examination:    Level of consciousness:  within normal limits   Appearance:  fair grooming and poor hygiene  Behavior/Motor:  psychomotor retardation  Attitude toward examiner:  cooperative, attentive, and evasive  Speech:  spontaneous   Mood: anxious  Affect:  mood congruent  Thought processes:  mildly disorganized but improving and linear   Thought content:  Homocidal ideation denies  Suicidal Ideation:  denies suicidal ideation  Delusions:  no evidence of delusions  Perceptual Disturbance:  denies any perceptual disturbance  Cognition:  oriented to person, place, and time   Concentration poor  Insight poor   Judgement poor     ASSESSMENT: Patient is starting to feel better  Patient symptoms are:  [] Well controlled   Improving  [] Worsening  [] No change      Diagnosis: UNSPECIFIED PSYCHOTIC DISORDER  Diffrential\" paranoid schizophrenia  Principal Problem:    Psychosis, unspecified psychosis type [facility]  Resolved Problems:    * No resolved hospital problems. *      LABS:    No results for input(s): WBC, HGB, PLT in the last 72 hours. No results for input(s): NA, K, CL, CO2, BUN, CREATININE, GLUCOSE in the last 72 hours. No results for input(s): BILITOT, ALKPHOS, AST, ALT in the last 72 hours. Lab Results   Component Value Date/Time    LABAMPH Neg 12/27/2022 08:45 PM    BARBSCNU Neg 12/27/2022 08:45 PM    LABBENZ Neg 12/27/2022 08:45 PM    LABMETH Neg 12/27/2022 08:45 PM    OPIATESCREENURINE Neg 12/27/2022 08:45 PM    PHENCYCLIDINESCREENURINE Neg 12/27/2022 08:45 PM    ETOH <10 12/27/2022 08:45 PM     Lab Results   Component Value Date/Time    TSH 1.200 12/27/2022 08:45 PM     No results found for: LITHIUM  No results found for: VALPROATE, CBMZ    RISK ASSESSMENT: decreasing impulsive risk of acting out towards self or others    Treatment Plan:  Reviewed current Medications with the patient. yes  Risks, benefits, side effects, drug-to-drug interactions and alternatives to treatment were discussed. Collateral information: no  CD evaluationno  Encourage patient to attend group and other milieu activities. Discharge planning discussed with the patient and treatment team.    PSYCHOTHERAPY/COUNSELING:  [x] Therapeutic interview  [x] Supportive  [] CBT  [] Ongoing  [] Other    [x] Patient continues to need, on a daily basis, active treatment furnished directly by or requiring the supervision of inpatient psychiatric personnel    DX: UNSPECIFIED PSYCHOSIS    PLAN: INCREASE INVEGA TO 4.5 MG       . Marcelina Jensen MD      Electronically signed by Marcelina Jensen MD on 1/1/2023 at 7:32 AM

## 2023-01-01 NOTE — PLAN OF CARE
Problem: ABCDS Injury Assessment  Goal: Absence of physical injury  Outcome: Progressing     Problem: Risk for Elopement  Goal: Patient will not exit the unit/facility without proper excort  Outcome: Progressing     Problem: Coping  Goal: Pt/Family able to verbalize concerns and demonstrate effective coping strategies  Description: INTERVENTIONS:  1. Assist patient/family to identify coping skills, available support systems and cultural and spiritual values  2. Provide emotional support, including active listening and acknowledgement of concerns of patient and caregivers  3. Reduce environmental stimuli, as able  4. Instruct patient/family in relaxation techniques, as appropriate  5. Assess for spiritual pain/suffering and initiate Spiritual Care, Psychosocial Clinical Specialist consults as needed  Outcome: Progressing     Problem: Confusion  Goal: Confusion, delirium, dementia, or psychosis is improved or at baseline  Description: INTERVENTIONS:  1. Assess for possible contributors to thought disturbance, including medications, impaired vision or hearing, underlying metabolic abnormalities, dehydration, psychiatric diagnoses, and notify attending LIP  2. [place] high risk fall precautions, as indicated  3. Provide frequent short contacts to provide reality reorientation, refocusing and direction  4. Decrease environmental stimuli, including noise as appropriate  5. Monitor and intervene to maintain adequate nutrition, hydration, elimination, sleep and activity  6. If unable to ensure safety without constant attention obtain sitter and review sitter guidelines with assigned personnel  7. Initiate Psychosocial CNS and Spiritual Care consult, as indicated  Outcome: Progressing     Problem: Behavior  Goal: Pt/Family maintain appropriate behavior and adhere to behavioral management agreement, if implemented  Description: INTERVENTIONS:  1.  Assess patient/family's coping skills and  non-compliant behavior (including use of illegal substances)  2. Notify security of behavior or suspected illegal substances which indicate the need for search of the family and/or belongings  3. Encourage verbalization of thoughts and concerns in a socially appropriate manner  4. Utilize positive, consistent limit setting strategies supporting safety of patient, staff and others  5. Encourage participation in the decision making process about the behavioral management agreement  6. If a visitor's behavior poses a threat to safety call refer to organization policy.   7. Initiate consult with , Psychosocial CNS, Spiritual Care as appropriate  Outcome: Progressing

## 2023-01-01 NOTE — PROGRESS NOTES
Pt. declined to attend the 0900 community meeting, despite staff encouragement.  Goal - \"Stay positive\" Electronically signed by COLLEEN Interiano on 1/1/2023 at 9:54 AM

## 2023-01-01 NOTE — PLAN OF CARE
Verbalizes frustration with feeling that her family feels she has mental illness. Grandmother did call and state that patient speaks only 1-2 word responses to her. Patient is future oriented and discussing finishing her college degree. Considering her next plan and wondering if she will be required to return her rental car to [facility]. Problem: ABCDS Injury Assessment  Goal: Absence of physical injury  Outcome: Progressing     Problem: Risk for Elopement  Goal: Patient will not exit the unit/facility without proper excort  Outcome: Progressing  Flowsheets (Taken 1/1/2023 1656)  Nursing Interventions for Elopement Risk: Assist with personal care needs such as toileting, eating, dressing, as needed to reduce the risk of wandering     Problem: Coping  Goal: Pt/Family able to verbalize concerns and demonstrate effective coping strategies  Description: INTERVENTIONS:  1. Assist patient/family to identify coping skills, available support systems and cultural and spiritual values  2. Provide emotional support, including active listening and acknowledgement of concerns of patient and caregivers  3. Reduce environmental stimuli, as able  4. Instruct patient/family in relaxation techniques, as appropriate  5. Assess for spiritual pain/suffering and initiate Spiritual Care, Psychosocial Clinical Specialist consults as needed  Outcome: Progressing  Flowsheets (Taken 1/1/2023 1656)  Patient/family able to verbalize anxieties, fears, and concerns, and demonstrate effective coping:   Assist patient/family to identify coping skills, available support systems and cultural and spiritual values   Provide emotional support, including active listening and acknowledgement of concerns of patient and caregivers   Reduce environmental stimuli, as able     Problem: Confusion  Goal: Confusion, delirium, dementia, or psychosis is improved or at baseline  Description: INTERVENTIONS:  1.  Assess for possible contributors to thought disturbance, including medications, impaired vision or hearing, underlying metabolic abnormalities, dehydration, psychiatric diagnoses, and notify attending LIP  2. [place] high risk fall precautions, as indicated  3. Provide frequent short contacts to provide reality reorientation, refocusing and direction  4. Decrease environmental stimuli, including noise as appropriate  5. Monitor and intervene to maintain adequate nutrition, hydration, elimination, sleep and activity  6. If unable to ensure safety without constant attention obtain sitter and review sitter guidelines with assigned personnel  7. Initiate Psychosocial CNS and Spiritual Care consult, as indicated  Outcome: Progressing  Flowsheets (Taken 1/1/2023 1656)  Effect of thought disturbance (confusion, delirium, dementia, or psychosis) are managed with adequate functional status: Decrease environmental stimuli, including noise as appropriate     Problem: Behavior  Goal: Pt/Family maintain appropriate behavior and adhere to behavioral management agreement, if implemented  Description: INTERVENTIONS:  1. Assess patient/family's coping skills and  non-compliant behavior (including use of illegal substances)  2. Notify security of behavior or suspected illegal substances which indicate the need for search of the family and/or belongings  3. Encourage verbalization of thoughts and concerns in a socially appropriate manner  4. Utilize positive, consistent limit setting strategies supporting safety of patient, staff and others  5. Encourage participation in the decision making process about the behavioral management agreement  6. If a visitor's behavior poses a threat to safety call refer to organization policy.   7. Initiate consult with , Psychosocial CNS, Spiritual Care as appropriate  Outcome: Progressing  Flowsheets (Taken 1/1/2023 1656)  Patient/family maintains appropriate behavior and adheres to behavioral management agreement, if implemented:   Assess patient/familys coping skills and  non-compliant behavior (including use of illegal substances)   Notify security of behavior or suspected illegal substances which indicate the need for search of the patient and/or belongings

## 2023-01-01 NOTE — GROUP NOTE
Group Therapy Note    Date: 1/1/2023    Group Start Time: 1000  Group End Time: 1050  Group Topic: Psychoeducation    [facility] [place] [facility]        Group Therapy Note    Attendees: 11/16       Patient's Goal:  \"Stay positive\"     Notes:  Patient attended the 1000 skills group. Patient was quiet but she was more attentive and more focused on her task in group.       Status After Intervention:  Unchanged    Participation Level: Fair    Participation Quality: Appropriate      Speech:  quiet      Thought Process/Content: Linear      Affective Functioning: Flat      Mood:  calm      Level of consciousness:  Alert      Response to Learning: Progressing to goal      Endings: None Reported    Modes of Intervention: Education, Socialization, and Activity      Discipline Responsible: Psychoeducational Specialist      Signature:  Danita Friday

## 2023-01-01 NOTE — GROUP NOTE
Group Therapy Note    Date: 1/1/2023    Group Start Time: 7104  Group End Time: 2533  Group Topic: Healthy Living/Wellness    [facility]    Laz Barrera LPN        Group Therapy Note: Socialization Skills    Attendees: 12/17     Status After Intervention:  Unchanged    Participation Level:  Active Listener and Interactive    Participation Quality: Appropriate, Attentive, and Supportive      Speech:  normal      Thought Process/Content: Logical      Affective Functioning: Congruent      Mood: euthymic      Level of consciousness:  Alert, Oriented x4, and Attentive      Response to Learning: Able to verbalize current knowledge/experience and Progressing to goal      Endings: None Reported    Modes of Intervention: Support and Socialization      Discipline Responsible: Licensed Practical Nurse      Signature:  Laz Barrera LPN

## 2023-01-02 PROCEDURE — 6370000000 HC RX 637 (ALT 250 FOR IP): Performed by: PSYCHIATRY & NEUROLOGY

## 2023-01-02 PROCEDURE — 1240000000 HC EMOTIONAL WELLNESS R&B

## 2023-01-02 RX ADMIN — PALIPERIDONE 4.5 MG: 3 TABLET, EXTENDED RELEASE ORAL at 17:12

## 2023-01-02 RX ADMIN — HYDROXYZINE PAMOATE 50 MG: 50 CAPSULE ORAL at 21:00

## 2023-01-02 RX ADMIN — TRAZODONE HYDROCHLORIDE 50 MG: 50 TABLET ORAL at 22:04

## 2023-01-02 NOTE — GROUP NOTE
Group Therapy Note    Date: 1/1/2023    Group Start Time: 8250  Group End Time: 1906  Group Topic: Recreational    [facility]    Mandi Giles LPN        Group Therapy Note: Recreational Therapy/Playing [employer]    Attendees: 9/17     Status After Intervention:  Unchanged    Participation Level:  Active Listener and Interactive    Participation Quality: Appropriate      Speech:  normal      Thought Process/Content: Logical      Affective Functioning: Congruent      Mood: euthymic      Level of consciousness:  Alert, Oriented x4, and Attentive      Response to Learning: Progressing to goal      Endings: None Reported    Modes of Intervention: Socialization and Activity      Discipline Responsible: Licensed Practical Nurse      Signature:  Mandi Giles LPN

## 2023-01-02 NOTE — GROUP NOTE
Group Therapy Note    Date: 1/2/2023    Group Start Time: 1400  Group End Time: 1048  Group Topic: Healthy Living/Wellness    [facility]    Hannah Curran RN        Group Therapy Note    Attendees: 13/20       Patient's Goal:  Discuss importance of sleep hygiene. Notes:  Alert and appropriate.      Status After Intervention:  Unchanged    Participation Level: Interactive    Participation Quality: Appropriate and Attentive      Speech:  normal      Thought Process/Content: Logical  Linear      Affective Functioning: Flat      Mood: euthymic      Level of consciousness:  Alert and Oriented x4      Response to Learning: Able to verbalize current knowledge/experience and Able to verbalize/acknowledge new learning      Endings: None Reported    Modes of Intervention: Education and Support      Discipline Responsible: Registered Nurse      Signature:  Hannah Curran RN

## 2023-01-02 NOTE — PROGRESS NOTES
[facility]. FOLLOW-UP NOTE       1/2/2023     Patient was seen and examined in person, Chart reviewed   Patient's case discussed with staff/team    Chief Complaint: Psychosis    Interim History:     Patient said she was feeling \"good\". She said she slept \"good', but \"wakes up a little groggy\". She said her appetite was \"good\". She said her mood was \"good\". She denied having any suicidal or homicidal ideation. She denied having auditory or visual hallucinations. She denied paranoia or other delusions. She said she does not believe anymore that she is being followed. Appetite:   [x] Normal/Unchanged  [] Increased  [] Decreased      Sleep:       [x] Normal/Unchanged  [] Fair       [] Poor              Energy:    [x] Normal/Unchanged  [] Increased  [] Decreased        SI [] Present  [x] Absent    HI  []Present  [x] Absent     Aggression:  [] yes  [x] no    Patient is [] able  [x] unable to CONTRACT FOR SAFETY     PAST MEDICAL/PSYCHIATRIC HISTORY:   Past Medical History:   Diagnosis Date    DVT (deep venous thrombosis) ([facility].) 09/01/2021    patient reports DVT, states she had pain in her calf       FAMILY/SOCIAL HISTORY:  No family history on file.   Social History     Socioeconomic History    Marital status: Single     Spouse name: Not on file    Number of children: Not on file    Years of education: Not on file    Highest education level: Not on file   Occupational History    Not on file   Tobacco Use    Smoking status: Every Day     Types: Cigarettes    Smokeless tobacco: Not on file   Substance and Sexual Activity    Alcohol use: Not on file    Drug use: Not on file    Sexual activity: Not on file   Other Topics Concern    Not on file   Social History Narrative    Not on file     Social Determinants of Health     Financial Resource Strain: Not on file   Food Insecurity: Not on file   Transportation Needs: Not on file   Physical Activity: Not on file   Stress: Not on file Social Connections: Not on file   Intimate Partner Violence: Not on file   Housing Stability: Not on file           ROS:  [x] All negative/unchanged except if checked.  Explain positive(checked items) below:  [] Constitutional  [] Eyes  [] Ear/Nose/Mouth/Throat  [] Respiratory  [] CV  [] GI  []   [] Musculoskeletal  [] Skin/Breast  [] Neurological  [] Endocrine  [] Heme/Lymph  [] Allergic/Immunologic    Explanation:     MEDICATIONS:    Current Facility-Administered Medications:     ibuprofen (ADVIL;MOTRIN) tablet 800 mg, 800 mg, Oral, Q12H PRN, Mich Bosch APRN - CNP    paliperidone (INVEGA) extended release tablet 4.5 mg, 4.5 mg, Oral, Dinner, Jay Pierce MD, 4.5 mg at 01/01/23 1702    guaiFENesin-dextromethorphan (ROBITUSSIN DM) 100-10 MG/5ML syrup 5 mL, 5 mL, Oral, Q4H PRN, Dalton Cadet APRN - CNP, 5 mL at 12/31/22 0859    acetaminophen (TYLENOL) tablet 650 mg, 650 mg, Oral, Q4H PRN, Clayton Donaldson MD    polyethylene glycol (GLYCOLAX) packet 17 g, 17 g, Oral, Daily PRN, Clayton Donaldson MD    traZODone (DESYREL) tablet 50 mg, 50 mg, Oral, Nightly PRN, Clayton Donaldson MD    aluminum & magnesium hydroxide-simethicone (MAALOX) 200-200-20 MG/5ML suspension 30 mL, 30 mL, Oral, Q6H PRN, Clayton Donaldson MD    nicotine (NICODERM CQ) 14 MG/24HR 1 patch, 1 patch, TransDERmal, Daily, Clayton Donaldson MD, 1 patch at 01/02/23 0834    benztropine mesylate (COGENTIN) injection 2 mg, 2 mg, IntraMUSCular, BID PRN, Clayton Donaldson MD    hydrOXYzine pamoate (VISTARIL) capsule 50 mg, 50 mg, Oral, Q6H PRN, 50 mg at 12/31/22 1616 **OR** hydrOXYzine (VISTARIL) injection 50 mg, 50 mg, IntraMUSCular, Q6H PRN, Clayton Donaldson MD    haloperidol (HALDOL) tablet 5 mg, 5 mg, Oral, Q6H PRN **OR** haloperidol lactate (HALDOL) injection 5 mg, 5 mg, IntraMUSCular, Q6H PRN, Clayton Donaldson MD      Examination:  BP (!) 146/82   Pulse (!) 109   Temp 97.7 °F (36.5 °C)   Resp 18   Ht 5' 6\" (1.676 m)   Wt 240 lb (108.9 kg)   SpO2 96%   BMI 38.74 kg/m²   Gait - steady  Medication side effects(SE): no    Mental Status Examination:    Level of consciousness:  within normal limits   Appearance: fair grooming and fair hygiene  Behavior/Motor: psychomotor retardation improving  Attitude toward examiner: cooperative  Speech: with normal rate, rhythm, prosody   Mood: stated to be improved  Affect: brighter; she is at times laughing (somewhat in a mood-incongruent manner)  Thought processes: more coherent   Thought content:  Delusions:  less paranoid, improving  Suicidal ideation: denies  Homicidal ideation: denies  Cognition:  oriented to person, place, and time   Concentration distractible  Insight poor   Judgement poor     ASSESSMENT:   Patient symptoms are:  [x] Well controlled  [] Improving  [] Worsening  [] No change      Diagnosis:   Principal Problem:    Psychosis, unspecified psychosis type ([facility]  Resolved Problems:    * No resolved hospital problems. *      LABS:    No results for input(s): WBC, HGB, PLT in the last 72 hours. No results for input(s): NA, K, CL, CO2, BUN, CREATININE, GLUCOSE in the last 72 hours. No results for input(s): BILITOT, ALKPHOS, AST, ALT in the last 72 hours. Lab Results   Component Value Date/Time    LABAMPH Neg 12/27/2022 08:45 PM    BARBSCNU Neg 12/27/2022 08:45 PM    LABBENZ Neg 12/27/2022 08:45 PM    LABMETH Neg 12/27/2022 08:45 PM    OPIATESCREENURINE Neg 12/27/2022 08:45 PM    PHENCYCLIDINESCREENURINE Neg 12/27/2022 08:45 PM    ETOH <10 12/27/2022 08:45 PM     Lab Results   Component Value Date/Time    TSH 1.200 12/27/2022 08:45 PM     No results found for: LITHIUM  No results found for: VALPROATE, CBMZ      Treatment Plan:  Reviewed current Medications with the patient. Medication as ordered  Risks, benefits, side effects, drug-to-drug interactions and alternatives to treatment were discussed.   Collateral information:   CD evaluation  Encourage patient to attend group and other milieu activities.   Discharge planning discussed with the patient and treatment team.    PSYCHOTHERAPY/COUNSELING:  [x] Therapeutic interview  [x] Supportive  [] CBT  [] Ongoing  [] Other    [x] Patient continues to need, on a daily basis, active treatment furnished directly by or requiring the supervision of inpatient psychiatric personnel      Anticipated Length of stay:         Electronically signed by Taiwo De Paz MD on 1/2/2023 at 8:46 AM

## 2023-01-02 NOTE — GROUP NOTE
Group Therapy Note    Date: 1/1/2023    Group Start Time: 2022  Group End Time: 2042  Group Topic: Wrap-Up    [facility]    Mame Mclaughlin RN        Group Therapy Note    Attendees: 8/17       Patient's Goal:  \"Be positive\". Notes:  Goal met.      Status After Intervention:  Unchanged    Participation Level: Minimal    Participation Quality: Resistant      Speech:  hesitant      Thought Process/Content: Delusional      Affective Functioning: Blunted      Mood: anxious and irritable      Level of consciousness:  Alert and Inattentive      Response to Learning: Capable of insight      Endings: None Reported    Modes of Intervention: Exploration      Discipline Responsible: Registered Nurse      Signature:  Mame Mclaughlin RN

## 2023-01-02 NOTE — GROUP NOTE
Group Therapy Note    Date: 1/2/2023    Group Start Time: 1000  Group End Time: 1055  Group Topic: Psychoeducation    [facility]    Neetu Wheeler        Group Therapy Note    Attendees:  13/20       Patient's Goal:  \"Stay optimistic\"    Notes:  Patient attended the 1000 skills group. Patient was attentive to her task, she worked creatively and actively on her project.      Status After Intervention:  Unchanged    Participation Level: good    Participation Quality: Appropriate and Attentive      Speech:  Mostly quiet      Thought Process/Content: Linear      Affective Functioning: Flat      Mood:  calm      Level of consciousness:  Alert      Response to Learning: Progressing to goal      Endings: None Reported    Modes of Intervention: Education, Socialization, and Activity      Discipline Responsible: Psychoeducational Specialist      Signature:  Neetu Wheeler

## 2023-01-02 NOTE — DISCHARGE INSTRUCTIONS
Keep all follow up appointments, take medications as ordered, utilize positive supports, abstain from use of alcohol and drugs. If symptoms return or you feel at risk to yourself or others, please call 911, return the nearest emergency room, or call your local crisis hotline:  [facility]: [phone]: [phone]: [phone]     Due to the [address] [facility]oking [facility] is not currently available. For assistance with quitting smoking please go to https://smokefree.gov. A prescription for an FDA-approved tobacco cessation medication was offered at discharge and the patient refused. Someone from [address] [place] [address] will be calling you tomorrow to follow up on your care. If you don't hear from us, give us a call! [phone]. You were offered but declined the annual influenza vaccine on 1/2/23.

## 2023-01-02 NOTE — DISCHARGE INSTR - DIET

## 2023-01-02 NOTE — PROGRESS NOTES
Morning Community Meeting Topics    Gregory Vivar attended the morning community meeting on 1/2/23. Topics discussed today     [x] Introduction  Day of the week and date  Mask distribution  Current mask requirements  [x]Teams  Explanation of  Green and Blue team criteria  Nurses assigned to each team for today  Explanation about green and blue paper  Date  Patient's Name  Patient's Nurse  Goals  [x] Visitation  Announce the visiting hours for the day  Announce which team is allowed to have visitors for the day  Review any updated Covid 19 requirements for visitors during visitation  Vaccine Card or negative Covid test within 48 hours of visit  State Identification  Patients are reminded to alert the  at least 1 hour before visitation   [x] Unit Orientation  Coffee use  Phone location and etiquette  Shower locations  [place] and dryer location and process  Common area expectations  Staff rounds expectation  [x] Meals   Educate patient to the menu  The patient is encouraged to fill out the menu to get preferences at mealtime  The patient is educated that if they do not fill out the menu, they will get the standard tray  The coffee pot is decaf, patient encouraged to order regular coffee from menu.   Educate patient to the meal process  Patient encouraged to eat snacks provided twice daily  Snacks may stay in patient room     [x] Discharge Process  Discharge expectations  Fill out the survey after discharge   [x] Hygiene  Daily showers encouraged  Showers availability discussed   Daily dressing encouraged  Discussed wearing street clothing  Education provided on where to place linens and clothing  Linens in the hamper  personal clothing does not go into the linen hamper  [x] Group   Patient encouraged to attend group provided  Time of Group Meetings discussed  Gentle reminder that attendance is a Physician order  [x] Movement  Chair exercises completed  Stretching completed  Notes: Goal - \"To stay optimistic\" Electronically signed by Lucrecia Causey, [address] on 1/2/2023 at 12:53 PM

## 2023-01-02 NOTE — GROUP NOTE
Group Therapy Note    Date: 1/2/2023    Group Start Time: 1645  Group End Time: 1700  Group Topic: Wrap-Up    [facility]    Eloy Burgess RN        Group Therapy Note    Attendees: 13/20       Patient's Goal:  To have a clear mind    Notes:  Progressing    Status After Intervention:  Unchanged    Participation Level:  Active Listener    Participation Quality: Appropriate      Speech:  normal      Thought Process/Content: Logical      Affective Functioning: Congruent      Mood: euthymic      Level of consciousness:  Alert      Response to Learning: Progressing to goal      Endings: None Reported    Modes of Intervention: Support      Discipline Responsible: Registered Nurse      Signature:  Eloy Burgess RN

## 2023-01-02 NOTE — PLAN OF CARE
Pt has been visible on unit throughout evening, social with peers. Noted to be on unit more and spending less time in bed. Pt has brightened affect and smiling often. Pt states she feels \"great\" and is going to focus more on herself moving forward in the new year. States she is not going to let her family bring her down. Compliant with medication and feels medication is effective. Denies disturbances with mood, sleep, or appetite. Denies questions or concerns.     Problem: ABCDS Injury Assessment  Goal: Absence of physical injury  1/2/2023 0414 by Catie Dey RN  Outcome: Progressing  1/1/2023 1707 by DEEPA Joy  Outcome: Progressing     Problem: Risk for Elopement  Goal: Patient will not exit the unit/facility without proper excort  1/2/2023 0414 by Catie Dey RN  Outcome: Progressing  1/1/2023 1707 by DEEPA Joy  Outcome: Progressing  Flowsheets (Taken 1/1/2023 1656)  Nursing Interventions for Elopement Risk: Assist with personal care needs such as toileting, eating, dressing, as needed to reduce the risk of wandering     Problem: Coping  Goal: Pt/Family able to verbalize concerns and demonstrate effective coping strategies  Description: INTERVENTIONS:  1. Assist patient/family to identify coping skills, available support systems and cultural and spiritual values  2. Provide emotional support, including active listening and acknowledgement of concerns of patient and caregivers  3. Reduce environmental stimuli, as able  4. Instruct patient/family in relaxation techniques, as appropriate  5. Assess for spiritual pain/suffering and initiate Spiritual Care, Psychosocial Clinical Specialist consults as needed  1/2/2023 0414 by Catie Dey RN  Outcome: Progressing  1/1/2023 1707 by DEEPA Joy  Outcome: Progressing  Flowsheets (Taken 1/1/2023 1656)  Patient/family able to verbalize anxieties, fears, and concerns, and demonstrate effective  coping:   Assist patient/family to identify coping skills, available support systems and cultural and spiritual values   Provide emotional support, including active listening and acknowledgement of concerns of patient and caregivers   Reduce environmental stimuli, as able     Problem: Confusion  Goal: Confusion, delirium, dementia, or psychosis is improved or at baseline  Description: INTERVENTIONS:  1. Assess for possible contributors to thought disturbance, including medications, impaired vision or hearing, underlying metabolic abnormalities, dehydration, psychiatric diagnoses, and notify attending LIP  2. [place] high risk fall precautions, as indicated  3. Provide frequent short contacts to provide reality reorientation, refocusing and direction  4. Decrease environmental stimuli, including noise as appropriate  5. Monitor and intervene to maintain adequate nutrition, hydration, elimination, sleep and activity  6. If unable to ensure safety without constant attention obtain sitter and review sitter guidelines with assigned personnel  7. Initiate Psychosocial CNS and Spiritual Care consult, as indicated  1/2/2023 0414 by Jef Holloway RN  Outcome: Progressing  1/1/2023 1707 by DEEPA Varghese - CNS  Outcome: Progressing  Flowsheets (Taken 1/1/2023 1656)  Effect of thought disturbance (confusion, delirium, dementia, or psychosis) are managed with adequate functional status: Decrease environmental stimuli, including noise as appropriate     Problem: Behavior  Goal: Pt/Family maintain appropriate behavior and adhere to behavioral management agreement, if implemented  Description: INTERVENTIONS:  1. Assess patient/family's coping skills and  non-compliant behavior (including use of illegal substances)  2. Notify security of behavior or suspected illegal substances which indicate the need for search of the family and/or belongings  3.  Encourage verbalization of thoughts and concerns in a socially appropriate manner  4. Utilize positive, consistent limit setting strategies supporting safety of patient, staff and others  5. Encourage participation in the decision making process about the behavioral management agreement  6. If a visitor's behavior poses a threat to safety call refer to organization policy.   7. Initiate consult with , Psychosocial CNS, Spiritual Care as appropriate  1/2/2023 0414 by Cecy Castillo RN  Outcome: Progressing  1/1/2023 1707 by DEEPA Lopez - CNS  Outcome: Progressing  Flowsheets (Taken 1/1/2023 1656)  Patient/family maintains appropriate behavior and adheres to behavioral management agreement, if implemented:   Assess patient/familys coping skills and  non-compliant behavior (including use of illegal substances)   Notify security of behavior or suspected illegal substances which indicate the need for search of the patient and/or belongings

## 2023-01-02 NOTE — PROGRESS NOTES
Pt out on unit, social with select peers. Pt voiced, goal, \"stay positive. \" Pt voiced upon discharge, will follow up outpatient therapy, if insurance approves. Pt voiced long term goal, \" travel on the road, state to state. \" Pt voiced goal today, \"stay positive, \" voiced, I'm okay right now. \" Pt voiced positive support, ex-boyfriend, Jose Manuel. Pt reports showering today. Pt reports good appetite. Pt reports good sleep. Pt denies anxiety, depression. Pt denies SI, HI and A/V hallucinations. Will continue to monitor.

## 2023-01-02 NOTE — GROUP NOTE
Group Therapy Note    Date: 1/2/2023    Group Start Time: 1600  Group End Time: 0163  Group Topic: Healthy Living/Wellness    [facility]    Austin Ling RN        Group Therapy Note    Attendees: 13/20       Patient's Goal:  Attend group    Notes:  Good participation    Status After Intervention:  Unchanged    Participation Level:  Active Listener    Participation Quality: Appropriate      Speech:  normal      Thought Process/Content: Logical      Affective Functioning: Congruent      Mood: euthymic      Level of consciousness:  Alert      Response to Learning: Progressing to goal      Endings: None Reported    Modes of Intervention: Education      Discipline Responsible: Registered Nurse      Signature:  Austin Ling RN

## 2023-01-02 NOTE — CARE COORDINATION
Group Therapy Note    Date: 1/2/2023  Start Time: 1300  End Time:  1330    Number of Participants: 12    Type of Group: Cognitive Skills    Patient's Goal:  To participate in cognitive skills group. Notes: Patient declined to attend psychoeducation group at 1300 despite encouragement by staff.      Discipline Responsible: /Counselor    TACO Ling

## 2023-01-03 VITALS
BODY MASS INDEX: 38.57 KG/M2 | OXYGEN SATURATION: 98 % | RESPIRATION RATE: 17 BRPM | TEMPERATURE: 98.1 F | HEART RATE: 109 BPM | DIASTOLIC BLOOD PRESSURE: 90 MMHG | HEIGHT: 66 IN | WEIGHT: 240 LBS | SYSTOLIC BLOOD PRESSURE: 141 MMHG

## 2023-01-03 PROCEDURE — 6370000000 HC RX 637 (ALT 250 FOR IP): Performed by: PSYCHIATRY & NEUROLOGY

## 2023-01-03 RX ORDER — PALIPERIDONE 1.5 MG/1
4.5 TABLET, EXTENDED RELEASE ORAL
Qty: 45 TABLET | Refills: 3 | Status: SHIPPED | OUTPATIENT
Start: 2023-01-03

## 2023-01-03 NOTE — CARE COORDINATION
Pt reports that her boyfriend is able to pick her up at discharge. Informed her that we have her cat that came to the ER with her.  Pt was very appreciative of staff caring for her cat while she was in the hospital. Electronically signed by TERESSA Gonzalez on 1/3/2023 at 9:30 AM

## 2023-01-03 NOTE — GROUP NOTE
Group Therapy Note    Date: 1/3/2023    Group Start Time: 1000  Group End Time: 1050  Group Topic: Psychoeducation    [facility]    Robina Mcmullen        Group Therapy Note    Attendees: 10/20       Patient's Goal:  \"To go home\"    Notes:  Patient attended the 1000 skills group. Patient was attentive, calm and she worked well on her project.      Status After Intervention:  Improved    Participation Level: Good    Participation Quality: Appropriate and Attentive      Speech:  normal      Thought Process/Content: Linear      Affective Functioning: Congruent      Mood:  calm      Level of consciousness:  Alert      Response to Learning: Progressing to goal      Endings: None Reported    Modes of Intervention: Education, Socialization, and Activity      Discipline Responsible: Psychoeducational Specialist      Signature:  Robina Mcmullen

## 2023-01-03 NOTE — PLAN OF CARE
Pt out on the unit most of the afternoon. Bright on approach and appropriate in conversation. She showered this evening and appears well kempt. Denies anxiety and depression. Denies SI, HI, AVH. She is attending groups. Problem: ABCDS Injury Assessment  Goal: Absence of physical injury  1/2/2023 1547 by DEEPA Miller  Outcome: Progressing     Problem: Risk for Elopement  Goal: Patient will not exit the unit/facility without proper excort  1/2/2023 1547 by DEEPA Miller  Outcome: Progressing     Problem: Coping  Goal: Pt/Family able to verbalize concerns and demonstrate effective coping strategies  Description: INTERVENTIONS:  1. Assist patient/family to identify coping skills, available support systems and cultural and spiritual values  2. Provide emotional support, including active listening and acknowledgement of concerns of patient and caregivers  3. Reduce environmental stimuli, as able  4. Instruct patient/family in relaxation techniques, as appropriate  5.  Assess for spiritual pain/suffering and initiate Spiritual Care, Psychosocial Clinical Specialist consults as needed  1/2/2023 1547 by DEEPA Miller  Outcome: Progressing  Flowsheets (Taken 1/2/2023 1534)  Patient/family able to verbalize anxieties, fears, and concerns, and demonstrate effective coping:   Assist patient/family to identify coping skills, available support systems and cultural and spiritual values   Provide emotional support, including active listening and acknowledgement of concerns of patient and caregivers

## 2023-01-03 NOTE — PROGRESS NOTES
Patient did not attend the 0900 community meeting due to doing an assessment at that time.  Electronically signed by Cassy [address] on 1/3/2023 at 12:52 PM df

## 2023-01-03 NOTE — DISCHARGE SUMMARY
DISCHARGE SUMMARY      Patient ID:  Kit Room  [id number]  32 y.o.  1995      Admit date: 12/27/2022    Discharge date and time: 1/3/2023    Admitting Physician: Lisette Landin MD     Discharge Physician: Dr Ai Renner MD    Admission Diagnoses: Psychosis, unspecified psychosis type [facility]) [F29]    Admission Condition: poor    Discharged Condition: stable    Admission Circumstance:     Patience Espinoza is a 32 y.o. female presents to the ED via EMS (with her cat). Reports returning from [facility] after visiting family for the holiday. Stopped in [facility] to shop. At [facility], planned to buy a new phone because she was getting google alerts from her phone telling her she was in danger, reported 2 black men were following her and wanted to harm her. Admists she made a scene at the check out. She notified the pharmacist to call 911. Sees a relationship between the 's name being Rand Perez which is the same as her mother. She admits to having an \"episode of anxiety, paranoia and fear\" but denies a diagnosis of schizophrenia and wants to know where ER is getting this information. Per ED, pt's mother was on the phone during the initial interview, mother reported the patient has \"a diagnosis of schizophrenia and needs psychiatric help\". Patient verbally revolked release for her mother and only wants staff to talk with her Amol Wong [phone]. Grandmother is not aware of psych history. Claims her sister was supportive but now \"pretends like she does not know\"     Patient has been living in [facility]. Moved from [place] to be with her boyfriend Vidhi Rey. The relationship ended because of constant power struggles. \"It was like the devil and death standing over the table and each would never give up who will control hell\". Went to visit family over Christmas to see if she might return to live there. Claims the visit went poorly.  They were \"calling me names, ignoring me and bringing me down\"     Pt presents irritable, hyperactive, rapid pressured speech. Mood is anxious and labile. Paranoid delusions of people being out to get her. Upset and agitated about being held against her will. Denies SI/HI and hallucinations. Claims she is \"Lutheran, spiritual and a psychic\" and her family caused the paranoia. \"I was forced to take drugs here today like [employer]\"      Patient reports taking Buspar from her PCP, denies being connected with a psychiatrist or therapist. I later spoke on the phone with mother's best friend who states change in behavior x1 month, continued paranoid delusions for the past month, persecutory delusions. Recently packed a suitcase, hopped on a bus and came to [place]. She is from [place]. Believes family members in [place] are conspiring against her. Patient stated to mother's best friend that she has had to drive 80 mph on the highway d/t people following her. Father is diagnosed with schizophrenia. Denies friedman issues,refused to discuss trauma history. Reports history of DVT and pulmonary embolism. Was last admitted medically in September. HISTORY OF PRESENT ILLNESS:       The patient is a 32 y.o. single female lives at home alone, currently unemployed, previously employed part time with significant past history of depression and anxiety. Delusional- believed [facility] had a gas leak in which everyone was evacuated; confirmation this did not occur. Pt further believes family is plotting against her, as well as multiple people chasing her. Increased paranoia and delusional thought process. Per collateral obtained from mother in ED, reports increased over the last month. Reports she is not actively open with mental health treatment; states she saw a psychiatrist once in her early twenties however did not like them so did not return. Pt state PCP previously prescribed Wellbutrin in the past but she does not currently take medication.      Patient reports driving from [place] to [facility] where she currently lives in apartment alone  Recently quit job  Report previous episode of paranoia and currently feeling paranoid  Disheveled   Labile affect; irritable  Guarded, evasive a times   Poor sleep and appetite  Patient states her mother was telling lies on her because they do not get along and her mother is a nurse and thinks she knows everything     Patient ruminating on statement made by mother stating she is schizophrenia, stating I do not have that, I do not have schizophrenia. Stressors: unemployment, recent break up, family conflict     The patient is not currently receiving care for the above psychiatric illness. Medications Prior to Admission:   Prescriptions Prior to Admission   Medications Prior to Admission: phentermine (ADIPEX-P) 37.5 MG tablet, Take 37.5 mg by mouth daily. Compliance:yes      Psychiatric Review of Systems       Depression: denies      Magy or Hypomania:  no     Panic Attacks:  no     Phobias:  no     Obsessions and Compulsions:  no     PTSD : denies     Hallucinations:  no     Delusions:  yes - paranoia, bizarre     Substance Abuse History:  ETOH: denies    Marijuana: denies   Opiates: denies   Other Drugs: denies         Past Psychiatric History:  Prior Diagnosis:  pt states unknown but believes MDD and SEBASTIAN; per collateral obtained in ED via Mom, pt dx schizophrenia   Psychiatrist: Denies   Therapist:Denies   Hospitalization: no  Hx of Suicidal Attempts: no  Hx of violence:  no  ECT: no  Previous discontinued Psychiatric Med Trials: Wellbutrin, Phenermine (started taking 3 moths ago and currently taking), Buspar       PAST MEDICAL/PSYCHIATRIC HISTORY:   Past Medical History:   Diagnosis Date    DVT (deep venous thrombosis) ([facility] [address] 09/01/2021    patient reports DVT, states she had pain in her calf       FAMILY/SOCIAL HISTORY:  No family history on file.   Social History     Socioeconomic History    Marital status: Single     Spouse name: Not on file    Number of children: Not on file    Years of education: Not on file    Highest education level: Not on file   Occupational History    Not on file   Tobacco Use    Smoking status: Every Day     Types: Cigarettes    Smokeless tobacco: Not on file   Substance and Sexual Activity    Alcohol use: Not on file    Drug use: Not on file    Sexual activity: Not on file   Other Topics Concern    Not on file   Social History Narrative    Not on file     Social Determinants of Health     Financial Resource Strain: Not on file   Food Insecurity: Not on file   Transportation Needs: Not on file   Physical Activity: Not on file   Stress: Not on file   Social Connections: Not on file   Intimate Partner Violence: Not on file   Housing Stability: Not on file       MEDICATIONS:    Current Facility-Administered Medications:     ibuprofen (ADVIL;MOTRIN) tablet 800 mg, 800 mg, Oral, Q12H PRN, Derrick Martinez APRN - CNP    paliperidone (INVEGA) extended release tablet 4.5 mg, 4.5 mg, Oral, Dinner, Cesia Jordan MD, 4.5 mg at 01/02/23 1712    guaiFENesin-dextromethorphan (ROBITUSSIN DM) 100-10 MG/5ML syrup 5 mL, 5 mL, Oral, Q4H PRN, John Rodriguez APRN - CNP, 5 mL at 12/31/22 0859    acetaminophen (TYLENOL) tablet 650 mg, 650 mg, Oral, Q4H PRN, Madina Robison MD    polyethylene glycol (GLYCOLAX) packet 17 g, 17 g, Oral, Daily PRN, Madina Robison MD    traZODone (DESYREL) tablet 50 mg, 50 mg, Oral, Nightly PRN, Madina Robison MD, 50 mg at 01/02/23 2204    aluminum & magnesium hydroxide-simethicone (MAALOX) 200-200-20 MG/5ML suspension 30 mL, 30 mL, Oral, Q6H PRN, Madina Robison MD    nicotine (NICODERM CQ) 14 MG/24HR 1 patch, 1 patch, TransDERmal, Daily, Madina Robison MD, 1 patch at 01/03/23 0914    benztropine mesylate (COGENTIN) injection 2 mg, 2 mg, IntraMUSCular, BID PRN, Madina Robison MD    hydrOXYzine pamoate (VISTARIL) capsule 50 mg, 50 mg, Oral, Q6H PRN, 50 mg at 01/02/23 2100 **OR** hydrOXYzine (VISTARIL) injection 50 mg, 50 mg, IntraMUSCular, Q6H PRN, Lisette Landin MD    haloperidol (HALDOL) tablet 5 mg, 5 mg, Oral, Q6H PRN **OR** haloperidol lactate (HALDOL) injection 5 mg, 5 mg, IntraMUSCular, Q6H PRN, Lisette Landin MD    Examination:  BP (!) 141/90   Pulse (!) 109   Temp 98.1 °F (36.7 °C) (Oral)   Resp 17   Ht 5' 6\" (1.676 m)   Wt 240 lb (108.9 kg)   SpO2 98%   BMI 38.74 kg/m²   Gait - steady    HOSPITAL COURSE[de-identified]  Following admission to the hospital, patient had a complete physical exam and blood work up  Patient was monitored closely with suicide precaution  Patient was started on invega  Was encouraged to participate in group and other milieu activity  Patient started to feel better with this combination of treatment. Significant progress in the symptoms since admission. Mood better, with the score of 2/10 - bad  No AVH or paranoid thoughts  No Hopeless or worthless feeling  No active SI/HI  Appetite:  [x] Normal  [] Increased  [] Decreased    Sleep:       [x] Normal  [] Fair       [] Poor            Energy:    [x] Normal  [] Increased  [] Decreased     SI [] Present  [x] Absent  HI  []Present  [x] Absent   Aggression:  [] yes  [] no  Patient is [x] able  [] unable to CONTRACT FOR SAFETY   Medication side effects(SE):  [x] None(Psych.  Meds.) [] Other      Mental Status Examination on discharge:    Level of consciousness:  within normal limits   Appearance:  well-appearing  Behavior/Motor:  no abnormalities noted  Attitude toward examiner:  attentive and good eye contact  Speech:  spontaneous, normal rate and normal volume   Mood: anxious  Affect:  reactive  Thought processes:  linear   Thought content:  Suicidal Ideation:  denies suicidal ideation  Cognition:  oriented to person, place, and time   Concentration intact  Memory intact  Insight good   Judgement fair   Fund of Knowledge adequate      ASSESSMENT:  Patient symptoms are:  [x] Well controlled  [x] Improving  [] Worsening  [] No change      Diagnosis:  Principal Problem:    Psychosis, unspecified psychosis type ([facility] [address]  Resolved Problems:    * No resolved hospital problems. *      LABS:    No results for input(s): WBC, HGB, PLT in the last 72 hours. No results for input(s): NA, K, CL, CO2, BUN, CREATININE, GLUCOSE in the last 72 hours. No results for input(s): BILITOT, ALKPHOS, AST, ALT in the last 72 hours. Lab Results   Component Value Date/Time    LABAMPH Neg 12/27/2022 08:45 PM    BARBSCNU Neg 12/27/2022 08:45 PM    LABBENZ Neg 12/27/2022 08:45 PM    LABMETH Neg 12/27/2022 08:45 PM    OPIATESCREENURINE Neg 12/27/2022 08:45 PM    PHENCYCLIDINESCREENURINE Neg 12/27/2022 08:45 PM    ETOH <10 12/27/2022 08:45 PM     Lab Results   Component Value Date/Time    TSH 1.200 12/27/2022 08:45 PM     No results found for: LITHIUM  No results found for: VALPROATE, CBMZ    RISK ASSESSMENT AT DISCHARGE: Low risk for suicide and homicide. Treatment Plan:  Reviewed current Medications with the patient. Education provided on the complaince with treatment. Risks, benefits, side effects, drug-to-drug interactions and alternatives to treatment were discussed. Encourage patient to attend outpatient follow up appointment and therapy. Patient was advised to call the outpatient provider, visit the nearest ED or call 911 if symptoms are not manageable. Patient's family member was contacted prior to the discharge.          Medication List        START taking these medications      paliperidone 1.5 MG extended release tablet  Commonly known as: INVEGA  Take 3 tablets by mouth Daily with supper            STOP taking these medications      phentermine 37.5 MG tablet  Commonly known as: ADIPEX-P               Where to Get Your Medications        You can get these medications from any pharmacy    Bring a paper prescription for each of these medications  paliperidone 1.5 MG extended release tablet           Reason for more than one antipsychotic:   [x] N/A  [] 3 failed monotherapy(drugs tried):  [] Cross over to a new antipsychotic  [] Taper to monotherapy from polypharmacy  [] Augmentation of Clozapine therapy due to treatment resistance to single therapy        TIME SPEND - 35 MINUTES TO COMPLETE THE EVALUATION, DISCHARGE SUMMARY, MEDICATION RECONCILIATION AND FOLLOW UP CARE     Signed:  Todd Portillo MD  1/3/2023  10:04 AM
